# Patient Record
Sex: MALE | Race: WHITE | NOT HISPANIC OR LATINO | Employment: OTHER | URBAN - METROPOLITAN AREA
[De-identification: names, ages, dates, MRNs, and addresses within clinical notes are randomized per-mention and may not be internally consistent; named-entity substitution may affect disease eponyms.]

---

## 2017-01-19 RX ORDER — AMLODIPINE BESYLATE 5 MG/1
TABLET ORAL
Qty: 60 TABLET | Refills: 0 | Status: SHIPPED | OUTPATIENT
Start: 2017-01-19 | End: 2017-02-15 | Stop reason: SDUPTHER

## 2017-01-24 ENCOUNTER — TELEPHONE (OUTPATIENT)
Dept: FAMILY MEDICINE | Facility: CLINIC | Age: 82
End: 2017-01-24

## 2017-01-24 NOTE — TELEPHONE ENCOUNTER
----- Message from Vonnie Elias sent at 1/24/2017 10:02 AM CST -----  Contact: [phone]  Pt is requesting a call back in regards to medication that the insurance doesn't want to approve Please call pt at your earliest convenience.  Thanks !

## 2017-01-25 ENCOUNTER — TELEPHONE (OUTPATIENT)
Dept: FAMILY MEDICINE | Facility: CLINIC | Age: 82
End: 2017-01-25

## 2017-01-25 NOTE — TELEPHONE ENCOUNTER
----- Message from Cem Amaya sent at 1/25/2017 11:48 AM CST -----  Contact: self  Pt requesting a call back regarding the PA for chlordiazepoxide (LIBRIUM) 10 MG capsule.  Please call pt at .    Thanks

## 2017-01-25 NOTE — TELEPHONE ENCOUNTER
----- Message from Vonnie Elias sent at 1/24/2017  4:27 PM CST -----  Contact: [phone]  Pt is returning the phone call Please call pt at your earliest convenience.  Thanks !

## 2017-02-05 RX ORDER — TAMSULOSIN HYDROCHLORIDE 0.4 MG/1
CAPSULE ORAL
Qty: 30 CAPSULE | Refills: 11 | Status: SHIPPED | OUTPATIENT
Start: 2017-02-05 | End: 2018-01-28 | Stop reason: SDUPTHER

## 2017-02-07 RX ORDER — FINASTERIDE 5 MG/1
TABLET, FILM COATED ORAL
Qty: 90 TABLET | Refills: 3 | Status: SHIPPED | OUTPATIENT
Start: 2017-02-07 | End: 2018-02-12 | Stop reason: SDUPTHER

## 2017-02-15 RX ORDER — AMLODIPINE BESYLATE 5 MG/1
TABLET ORAL
Qty: 60 TABLET | Refills: 0 | Status: SHIPPED | OUTPATIENT
Start: 2017-02-15 | End: 2017-03-16 | Stop reason: SDUPTHER

## 2017-02-16 NOTE — TELEPHONE ENCOUNTER
----- Message from Ludmila Ynag sent at 2/16/2017  1:47 PM CST -----  Refill: hydrocodone-acetaminophen 5-325mg (NORCO) 5-325 mg per tablet    Patient can be reached at [phone]. Thank you!

## 2017-02-17 RX ORDER — HYDROCODONE BITARTRATE AND ACETAMINOPHEN 5; 325 MG/1; MG/1
1 TABLET ORAL EVERY 6 HOURS PRN
Qty: 120 TABLET | Refills: 0 | Status: SHIPPED | OUTPATIENT
Start: 2017-02-17 | End: 2017-05-18 | Stop reason: SDUPTHER

## 2017-02-22 ENCOUNTER — OFFICE VISIT (OUTPATIENT)
Dept: PODIATRY | Facility: CLINIC | Age: 82
End: 2017-02-22
Payer: MEDICARE

## 2017-02-22 VITALS — BODY MASS INDEX: 23.04 KG/M2 | WEIGHT: 130 LBS | HEIGHT: 63 IN

## 2017-02-22 DIAGNOSIS — L60.2 ONYCHAUXIS: Primary | ICD-10-CM

## 2017-02-22 PROCEDURE — 17999 UNLISTD PX SKN MUC MEMB SUBQ: CPT | Mod: CSM,,, | Performed by: PODIATRIST

## 2017-02-22 PROCEDURE — 99999 PR PBB SHADOW E&M-EST. PATIENT-LVL II: CPT | Mod: PBBFAC,,, | Performed by: PODIATRIST

## 2017-02-22 PROCEDURE — 99499 UNLISTED E&M SERVICE: CPT | Mod: CSM,,, | Performed by: PODIATRIST

## 2017-03-09 RX ORDER — CHLORDIAZEPOXIDE HYDROCHLORIDE 10 MG/1
CAPSULE, GELATIN COATED ORAL
Qty: 60 CAPSULE | Refills: 5 | Status: SHIPPED | OUTPATIENT
Start: 2017-03-09 | End: 2017-06-06 | Stop reason: SDUPTHER

## 2017-03-10 RX ORDER — CHLORDIAZEPOXIDE HYDROCHLORIDE 10 MG/1
CAPSULE, GELATIN COATED ORAL
Qty: 60 CAPSULE | OUTPATIENT
Start: 2017-03-10

## 2017-03-16 RX ORDER — AMLODIPINE BESYLATE 5 MG/1
TABLET ORAL
Qty: 60 TABLET | Refills: 0 | Status: SHIPPED | OUTPATIENT
Start: 2017-03-16 | End: 2017-04-13 | Stop reason: SDUPTHER

## 2017-04-13 RX ORDER — AMLODIPINE BESYLATE 5 MG/1
TABLET ORAL
Qty: 60 TABLET | Refills: 0 | Status: SHIPPED | OUTPATIENT
Start: 2017-04-13 | End: 2017-06-08 | Stop reason: SDUPTHER

## 2017-05-09 RX ORDER — MIRTAZAPINE 45 MG/1
TABLET, FILM COATED ORAL
Qty: 30 TABLET | Refills: 11 | Status: SHIPPED | OUTPATIENT
Start: 2017-05-09 | End: 2017-07-13 | Stop reason: SDUPTHER

## 2017-05-18 NOTE — TELEPHONE ENCOUNTER
----- Message from Chelsea Olson sent at 5/18/2017  9:21 AM CDT -----  Contact: self  Refill request for Hydrocodone .     [phone]    LL

## 2017-05-19 RX ORDER — HYDROCODONE BITARTRATE AND ACETAMINOPHEN 5; 325 MG/1; MG/1
1 TABLET ORAL EVERY 6 HOURS PRN
Qty: 120 TABLET | Refills: 0 | Status: SHIPPED | OUTPATIENT
Start: 2017-05-19 | End: 2017-06-06 | Stop reason: SDUPTHER

## 2017-05-19 NOTE — TELEPHONE ENCOUNTER
CALL we got your pain med request and dr leslie mcdonough your last appt was last year and it is best to have an appt every 3-4 mo to get refills. We will process this one then set an appt before this one runs out      Looks like it lasts you 3 months?    Confirm that time      Return refill

## 2017-05-24 ENCOUNTER — OFFICE VISIT (OUTPATIENT)
Dept: PODIATRY | Facility: CLINIC | Age: 82
End: 2017-05-24
Payer: MEDICARE

## 2017-05-24 VITALS — BODY MASS INDEX: 23.04 KG/M2 | WEIGHT: 130 LBS | HEIGHT: 63 IN

## 2017-05-24 DIAGNOSIS — L60.2 ONYCHAUXIS: Primary | ICD-10-CM

## 2017-05-24 PROCEDURE — 99999 PR PBB SHADOW E&M-EST. PATIENT-LVL III: CPT | Mod: PBBFAC,,, | Performed by: PODIATRIST

## 2017-05-24 PROCEDURE — 17999 UNLISTD PX SKN MUC MEMB SUBQ: CPT | Mod: CSM,S$GLB,, | Performed by: PODIATRIST

## 2017-05-24 PROCEDURE — 99499 UNLISTED E&M SERVICE: CPT | Mod: S$GLB,,, | Performed by: PODIATRIST

## 2017-06-06 ENCOUNTER — OFFICE VISIT (OUTPATIENT)
Dept: FAMILY MEDICINE | Facility: CLINIC | Age: 82
End: 2017-06-06
Payer: MEDICARE

## 2017-06-06 ENCOUNTER — LAB VISIT (OUTPATIENT)
Dept: LAB | Facility: HOSPITAL | Age: 82
End: 2017-06-06
Attending: INTERNAL MEDICINE
Payer: MEDICARE

## 2017-06-06 VITALS
DIASTOLIC BLOOD PRESSURE: 70 MMHG | SYSTOLIC BLOOD PRESSURE: 110 MMHG | BODY MASS INDEX: 23.28 KG/M2 | OXYGEN SATURATION: 97 % | HEIGHT: 63 IN | HEART RATE: 59 BPM | TEMPERATURE: 98 F | WEIGHT: 131.38 LBS

## 2017-06-06 VITALS
WEIGHT: 130.5 LBS | RESPIRATION RATE: 19 BRPM | TEMPERATURE: 98 F | DIASTOLIC BLOOD PRESSURE: 70 MMHG | HEIGHT: 63 IN | BODY MASS INDEX: 23.12 KG/M2 | OXYGEN SATURATION: 97 % | HEART RATE: 59 BPM | SYSTOLIC BLOOD PRESSURE: 110 MMHG

## 2017-06-06 DIAGNOSIS — F13.20 SEDATIVE DEPENDENCE: ICD-10-CM

## 2017-06-06 DIAGNOSIS — D64.9 ANEMIA, UNSPECIFIED TYPE: ICD-10-CM

## 2017-06-06 DIAGNOSIS — E87.5 HYPERKALEMIA: ICD-10-CM

## 2017-06-06 DIAGNOSIS — F39 MOOD DISORDER: ICD-10-CM

## 2017-06-06 DIAGNOSIS — I48.0 PAROXYSMAL ATRIAL FIBRILLATION: ICD-10-CM

## 2017-06-06 DIAGNOSIS — I10 ESSENTIAL HYPERTENSION: ICD-10-CM

## 2017-06-06 DIAGNOSIS — Z00.00 ENCOUNTER FOR PREVENTIVE HEALTH EXAMINATION: Primary | ICD-10-CM

## 2017-06-06 DIAGNOSIS — F41.9 CHRONIC ANXIETY: ICD-10-CM

## 2017-06-06 DIAGNOSIS — Z86.73 HISTORY OF STROKE: ICD-10-CM

## 2017-06-06 DIAGNOSIS — N40.0 BENIGN PROSTATIC HYPERPLASIA, PRESENCE OF LOWER URINARY TRACT SYMPTOMS UNSPECIFIED, UNSPECIFIED MORPHOLOGY: ICD-10-CM

## 2017-06-06 DIAGNOSIS — Z79.01 ANTICOAGULATED ON COUMADIN: ICD-10-CM

## 2017-06-06 DIAGNOSIS — I77.9 CAROTID DISEASE, BILATERAL: ICD-10-CM

## 2017-06-06 DIAGNOSIS — E78.5 HYPERLIPIDEMIA, UNSPECIFIED HYPERLIPIDEMIA TYPE: ICD-10-CM

## 2017-06-06 DIAGNOSIS — M06.9 RHEUMATOID ARTHRITIS, INVOLVING UNSPECIFIED SITE, UNSPECIFIED RHEUMATOID FACTOR PRESENCE: ICD-10-CM

## 2017-06-06 DIAGNOSIS — N17.9 ACUTE RENAL FAILURE, UNSPECIFIED ACUTE RENAL FAILURE TYPE: ICD-10-CM

## 2017-06-06 DIAGNOSIS — I70.1 RAS (RENAL ARTERY STENOSIS): ICD-10-CM

## 2017-06-06 DIAGNOSIS — Z00.00 ROUTINE MEDICAL EXAM: Primary | ICD-10-CM

## 2017-06-06 DIAGNOSIS — G89.29 OTHER CHRONIC PAIN: ICD-10-CM

## 2017-06-06 LAB
ALBUMIN SERPL BCP-MCNC: 4.1 G/DL
ALP SERPL-CCNC: 92 U/L
ALT SERPL W/O P-5'-P-CCNC: 20 U/L
ANION GAP SERPL CALC-SCNC: 10 MMOL/L
AST SERPL-CCNC: 23 U/L
BASOPHILS # BLD AUTO: 0.04 K/UL
BASOPHILS NFR BLD: 0.4 %
BILIRUB SERPL-MCNC: 0.6 MG/DL
BUN SERPL-MCNC: 20 MG/DL
CALCIUM SERPL-MCNC: 9.4 MG/DL
CHLORIDE SERPL-SCNC: 106 MMOL/L
CO2 SERPL-SCNC: 24 MMOL/L
CREAT SERPL-MCNC: 1.5 MG/DL
DIFFERENTIAL METHOD: ABNORMAL
EOSINOPHIL # BLD AUTO: 0.1 K/UL
EOSINOPHIL NFR BLD: 0.7 %
ERYTHROCYTE [DISTWIDTH] IN BLOOD BY AUTOMATED COUNT: 14.1 %
EST. GFR  (AFRICAN AMERICAN): 48 ML/MIN/1.73 M^2
EST. GFR  (NON AFRICAN AMERICAN): 41.6 ML/MIN/1.73 M^2
GLUCOSE SERPL-MCNC: 104 MG/DL
HCT VFR BLD AUTO: 43.7 %
HGB BLD-MCNC: 13.9 G/DL
LYMPHOCYTES # BLD AUTO: 2.1 K/UL
LYMPHOCYTES NFR BLD: 20.5 %
MCH RBC QN AUTO: 28.7 PG
MCHC RBC AUTO-ENTMCNC: 31.8 %
MCV RBC AUTO: 90 FL
MONOCYTES # BLD AUTO: 1 K/UL
MONOCYTES NFR BLD: 10 %
NEUTROPHILS # BLD AUTO: 6.9 K/UL
NEUTROPHILS NFR BLD: 68.2 %
PLATELET # BLD AUTO: 266 K/UL
PMV BLD AUTO: 10.7 FL
POTASSIUM SERPL-SCNC: 4.8 MMOL/L
PROT SERPL-MCNC: 8.1 G/DL
RBC # BLD AUTO: 4.85 M/UL
SODIUM SERPL-SCNC: 140 MMOL/L
TSH SERPL DL<=0.005 MIU/L-ACNC: 1.57 UIU/ML
WBC # BLD AUTO: 10.12 K/UL

## 2017-06-06 PROCEDURE — 80053 COMPREHEN METABOLIC PANEL: CPT

## 2017-06-06 PROCEDURE — 84165 PROTEIN E-PHORESIS SERUM: CPT

## 2017-06-06 PROCEDURE — 99397 PER PM REEVAL EST PAT 65+ YR: CPT | Mod: 25,S$GLB,, | Performed by: INTERNAL MEDICINE

## 2017-06-06 PROCEDURE — 85025 COMPLETE CBC W/AUTO DIFF WBC: CPT

## 2017-06-06 PROCEDURE — 86334 IMMUNOFIX E-PHORESIS SERUM: CPT

## 2017-06-06 PROCEDURE — 1126F AMNT PAIN NOTED NONE PRSNT: CPT | Mod: S$GLB,,, | Performed by: INTERNAL MEDICINE

## 2017-06-06 PROCEDURE — 86334 IMMUNOFIX E-PHORESIS SERUM: CPT | Mod: 26,,, | Performed by: PATHOLOGY

## 2017-06-06 PROCEDURE — 99499 UNLISTED E&M SERVICE: CPT | Mod: S$GLB,,, | Performed by: INTERNAL MEDICINE

## 2017-06-06 PROCEDURE — 1159F MED LIST DOCD IN RCRD: CPT | Mod: S$GLB,,, | Performed by: INTERNAL MEDICINE

## 2017-06-06 PROCEDURE — G0439 PPPS, SUBSEQ VISIT: HCPCS | Mod: S$GLB,,, | Performed by: NURSE PRACTITIONER

## 2017-06-06 PROCEDURE — 84443 ASSAY THYROID STIM HORMONE: CPT

## 2017-06-06 PROCEDURE — 36415 COLL VENOUS BLD VENIPUNCTURE: CPT | Mod: PO

## 2017-06-06 PROCEDURE — 85651 RBC SED RATE NONAUTOMATED: CPT

## 2017-06-06 PROCEDURE — 99499 UNLISTED E&M SERVICE: CPT | Mod: S$GLB,,, | Performed by: NURSE PRACTITIONER

## 2017-06-06 PROCEDURE — 99999 PR PBB SHADOW E&M-EST. PATIENT-LVL V: CPT | Mod: PBBFAC,,, | Performed by: NURSE PRACTITIONER

## 2017-06-06 PROCEDURE — 99214 OFFICE O/P EST MOD 30 MIN: CPT | Mod: 25,S$GLB,, | Performed by: INTERNAL MEDICINE

## 2017-06-06 PROCEDURE — 99999 PR PBB SHADOW E&M-EST. PATIENT-LVL III: CPT | Mod: PBBFAC,,, | Performed by: INTERNAL MEDICINE

## 2017-06-06 RX ORDER — CHLORDIAZEPOXIDE HYDROCHLORIDE 10 MG/1
20 CAPSULE, GELATIN COATED ORAL NIGHTLY
Qty: 60 CAPSULE | Refills: 5 | Status: SHIPPED | OUTPATIENT
Start: 2017-06-06 | End: 2017-10-02 | Stop reason: SDUPTHER

## 2017-06-06 RX ORDER — HYDROCODONE BITARTRATE AND ACETAMINOPHEN 5; 325 MG/1; MG/1
1 TABLET ORAL EVERY 6 HOURS PRN
Qty: 120 TABLET | Refills: 0 | Status: SHIPPED | OUTPATIENT
Start: 2017-06-06 | End: 2017-10-16 | Stop reason: SDUPTHER

## 2017-06-06 NOTE — PROGRESS NOTES
CHIEF COMPLAINT:  Physical.                                                                                                                     HISTORY OF PRESENT ILLNESS:  An 86year-old white male going to the gym      three times per week.  He simply just does not have an appetite.     He is a nonsmoker.  His chestx-ray in 2008 was normal.  Colonoscopy in 2006, normal with next in 10       Years and not likely clinically indicated based upon his age.  He has had PSAs in the past but also not likely indicated to continue to follow the previously normal PSA based upon his age  .                                                                                          REVIEW OF SYSTEMS:  Weight  stable currently   .  No fevers or chills.  No    vision or ENT symptoms.  No dysphagia.  No chest pain, shortness of breath.   No GI or  problems that are new.  No new myalgias or arthralgias.  No     new skin rashes.  All other complete review of systems negative.                                                                                          PAST MEDICAL HISTORY:                                                        1. Hypertension.                                                             2. Hyperlipidemia.                                                           3. Severe knee DJD.                                                          4. Questionable rheumatoid arthritis, the patient presented to our clinic on prednisone 5 mg, started by a rheumatologist Dr. Dao. The patient  denied ever having any hand, DJD or arthritis problems.                      5. Remote history of peptic ulcer disease requiring surgery.                 6. Lumbar disk surgery by Dr. Mckay.                                      7. Tinea corporis.                                                           8. Nonspecific stress echo at [facility] 2000.                                  9. Cardiac angiogram apparently at [facility]  with chest pain that was  unremarkable, no report.                                                     10. Chronic anxiety and occasional panic attacks.                            11. ED.                                                                      12. Gout.                                                                    13. Prostatitis/ BPH -Dr Ranjit Reyna                                                         14. Neck pain with numbness in both arms, assessed by Dr. Mackey  15.  Atrial fibrillation.    Sandra                                                 16.  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                                                     17.  Severe carotid disease, 75% right internal carotid, 50% left internal  carotid and 70% left external carotid.     18.   Colonoscopy in 2006, normal                                                                                                                                                                                                                                                        SOCIAL HISTORY: Does not smoke or drink.                                                                                                                  FAMILY HISTORY: Unremarkable.                                   PHYSICAL EXAMINATION:                                                        VITAL SIGNS:   as above    GENERAL:  Pleasant thin male.                                                HEENT:  Conjunctivae clear.  Pupils equal and reactive.  Nose and mouth      clear and moist.  Teeth edentulous.  Impaction on right removed with pick by MD himself                                        NECK:  Supple.  Thyroid nonpalpable.                                         RESPIRATORY:  Effort is good.                                                LUNGS:  All clear.                                                           HEART:  Regular rate and rhythm without murmurs, gallops or  rubs.  No        carotid bruits.  No edema.                                                   ABDOMEN:  Soft, nondistended, nontender.  No hepatosplenomegaly.             SKIN:  No rashes.  Warm to touch.                                            EXTREMITIES:  Gait normal.  Digits without clubbing or cyanosis.                                                                                          Labs and x-ray reports reviewed as was colonoscopy.                                                                                                       Cerumen impaction procedure note: after verbal informed consent obtained, physician himself remove the wax from the right ear with an ear pick    Quintana was seen today for cerumen impaction.    Diagnoses and all orders for this visit:    Routine medical exam, we'll update lab work.  Pursuing further colonoscopy and PSA are not appropriate upon his age.  He will continue to be active.  Prescription management done.                                          Additional evaluation and management issues:    Additionally, patient has numerous other total issues to address today.  We reviewed his prior labs and he has had a history of mild hyperkalemia and CKD which we will reassess.  He has rheumatoid arthritis but his clinical disease appears stable.  He has hyperlipidemia which has been assessed in the past and has been normal so we will defer and likely he has had it checked recently by his cardiologist.  He sees the heart clinic for his atrial fibrillation and his monitoring of his Coumadin.  We'll recheck a CBC on chronic anticoagulation.  He does have a history of stroke and blood pressures under good control and he has had no interval neurological deficits.  He has had a slight hemoglobin reduction which we will reassess.  His essential hypertension has been under good control.  His weight is been stable but we will check a TSH.  Regarding his chronic pains is 1  prescription of hydrocodone 120 has lasted in 3 months and he would like a refill.  He wastaking Valium during the day but caused sedation so quit and doing good on celexa.  Librium at night for sleep and he has done so chronically without side effects and he was on this particular pattern of medications prior to being under my care.  CKD III needs repeat. All the separate issues reviewed and patient counseled an evaluation and management will be based upon time counseling.Total time over 25 minutes with over 50% counseling.          Assessment and plan:            Hyperkalemia, reassess    Rheumatoid arthritis, involving unspecified site, unspecified rheumatoid factor presence, contributing to his overall chronic pain for which prescription management and monitoring done today.  He only takes 1 pain pill a day in the prescription of 120 typically last him 3 months    Essential hypertension, chronic and stable    Anticoagulated on Coumadin, check CBC    Hyperlipidemia, unspecified hyperlipidemia type, chronic and stable, followed by outside cardiologist as well    History of stroke, no recent neurological symptoms    Anemia, unspecified type, reassess    TO (renal artery stenosis), blood pressure and renal function will be assessed    Chronic anxiety, discontinue the Valium during the day which is perfectly okay and continues on the Librium at night    Mood disorder, continue Celexa    Paroxysmal atrial fibrillation, rate controlled    Benign prostatic hyperplasia, presence of lower urinary tract symptoms unspecified, unspecified morphology    Other chronic pain, management as above    Sedative dependence, 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

## 2017-06-07 PROBLEM — I10 ESSENTIAL HYPERTENSION: Status: ACTIVE | Noted: 2017-06-07

## 2017-06-07 PROBLEM — F39 MOOD DISORDER: Status: ACTIVE | Noted: 2017-06-07

## 2017-06-07 LAB
ALBUMIN SERPL ELPH-MCNC: 4.13 G/DL
ALPHA1 GLOB SERPL ELPH-MCNC: 0.31 G/DL
ALPHA2 GLOB SERPL ELPH-MCNC: 0.82 G/DL
B-GLOBULIN SERPL ELPH-MCNC: 1.17 G/DL
ERYTHROCYTE [SEDIMENTATION RATE] IN BLOOD BY WESTERGREN METHOD: 3 MM/HR
GAMMA GLOB SERPL ELPH-MCNC: 1.27 G/DL
PROT SERPL-MCNC: 7.7 G/DL

## 2017-06-07 NOTE — PROGRESS NOTES
"Mariano Clay presented for a  Medicare AWV and comprehensive Health Risk Assessment today. The following components were reviewed and updated:    · Medical history  · Family History  · Social history  · Allergies and Current Medications  · Health Risk Assessment  · Health Maintenance  · Care Team     ** See Completed Assessments for Annual Wellness Visit within the encounter summary.**       The following assessments were completed:  · Living Situation  · CAGE  · Depression Screening  · Timed Get Up and Go  · Whisper Test  · Cognitive Function Screening  · Nutrition Screening  · ADL Screening  · PAQ Screening    Vitals:    06/06/17 1343   BP: 110/70   BP Location: Left arm   Patient Position: Sitting   BP Method: Manual   Pulse: (!) 59   Resp: 19   Temp: 97.7 °F (36.5 °C)   TempSrc: Oral   SpO2: 97%   Weight: 59.2 kg (130 lb 8.2 oz)   Height: 5' 3" (1.6 m)     Body mass index is 23.12 kg/m².  Physical Exam   Constitutional: He is oriented to person, place, and time.   Cardiovascular: Normal rate and regular rhythm.    Pulmonary/Chest: Effort normal.   Musculoskeletal: Normal range of motion. He exhibits no edema.   Neurological: He is alert and oriented to person, place, and time.   Skin: Skin is warm.   Psychiatric: He has a normal mood and affect. His behavior is normal. Thought content normal.   Vitals reviewed.        Diagnoses and health risks identified today and associated recommendations/orders:    1. Encounter for preventive health examination  Education provided about preventive health examinations and procedures; addressed and discussed patient's health concerns. Additionally, reviewed medical record for risk factors and documented the results during this encounter.    2. Paroxysmal atrial fibrillation  Stable, followed by [facility], currently denies worsening symptoms; continue as advised.     3. Carotid disease, bilateral  Stable, asymptomatic on ASA, cholesterol managing STATIN, and " antihypertensive medication; monitor    4. Rheumatoid arthritis, involving unspecified site, unspecified rheumatoid factor presence  Evaluated by rheumatology (Dr. Dao). Currently denies any problems.     5. Mood disorder  Symptoms controlled. Education provided about mood disorder. Continue as advised.     6. Chronic anxiety  Symptoms controlled. Education provided about anxiety. Continue as advised.     7. Anticoagulated on Coumadin  Stable, followed by [facility], currently denies worsening symptoms; continue as advised.     8. History of stroke  Stable, asymptomatic on ASA, cholesterol managing STATIN, and antihypertensive medication; monitor    9. Hyperlipidemia, unspecified hyperlipidemia type  Educated about diet, activities, and ways to avoid sedentary lifestyle.   Cholesterol monitored by cardiologist.     10. Benign prostatic hyperplasia, presence of lower urinary tract symptoms unspecified, unspecified morphology  Symptoms controlled. Education provided about BPH. Continue as advised.     11. Anemia, unspecified type  Stable, asymptomatic; monitor.     12. Essential hypertension  At goal. The current medical regimen is effective;  continue present plan and medications.    13. Hyperkalemia  Patient will discuss with PCP. Scheduled to get labs completed today after PCP visit.     Reviewed health maintenance with patient, educated about recommended examinations, procedures (labs & images), and immunizations.  He deferred pneumonia vaccine until he's seen by PCP.      Provided Quintana with a 5-10 year written screening schedule and personal prevention plan. Recommendations were developed using the USPSTF age appropriate recommendations. Education, counseling, and referrals were provided as needed. After Visit Summary printed and given to patient which includes a list of additional screenings\tests needed.    Return in about 1 year (around 6/6/2018).    Mahin Renae Jr, NP

## 2017-06-07 NOTE — PATIENT INSTRUCTIONS
Counseling and Referral of Other Preventative  (Italic type indicates deductible and co-insurance are waived)    Patient Name: Mariano Clay  Today's Date: 6/7/2017      SERVICE LIMITATIONS RECOMMENDATION    Vaccines    · Pneumococcal (once after 65)    · Influenza (annually)    · Hepatitis B (if medium/high risk)    · Prevnar 13      Hepatitis B medium/high risk factors:       - End-stage renal disease       - Hemophiliacs who received Factor VII or         IX concentrates       - Clients of institutions for the mentally             retarded       - Persons who live in the same house as          a HepB carrier       - Homosexual men       - Illicit injectable drug abusers     Pneumococcal: Done, no repeat necessary     Influenza: Done, repeat in one year     Hepatitis B: N/A     Prevnar 13: Done, no repeat necessary    Prostate cancer screening (annually to age 75)     Prostate specific antigen (PSA) Shared decision making with Provider. Sometimes a co-pay may be required if the patient decides to have this test. The USPSTF no longer recommends prostate cancer screening routinely in medicine: followed by [facility] urology     Colorectal cancer screening (to age 75)    · Fecal occult blood test (annual)  · Flexible sigmoidoscopy (5y)  · Screening colonoscopy (10y)  · Barium enema   N/A    Diabetes self-management training (no USPSTF recommendations)  Requires referral by treating physician for patient with diabetes or renal disease. 10 hours of initial DSMT sessions of no less than 30 minutes each in a continuous 12-month period. 2 hours of follow-up DSMT in subsequent years.  N/A    Glaucoma screening (no USPSTF recommendation)  Diabetes mellitus, family history   , age 50 or over    American, age 65 or over  discussed with patient      Medical nutrition therapy for diabetes or renal disease (no recommended schedule)  Requires referral by treating physician for patient with diabetes or renal  disease or kidney transplant within the past 3 years.  Can be provided in same year as diabetes self-management training (DSMT), and CMS recommends medical nutrition therapy take place after DSMT. Up to 3 hours for initial year and 2 hours in subsequent years.  N/A    Cardiovascular screening blood tests (every 5 years)  · Fasting lipid panel  Order as a panel if possible  Last done 12/2013, he's [facility]     Diabetes screening tests (at least every 3 years, Medicare covers annually or at 6-month intervals for prediabetic patients)  · Fasting blood sugar (FBS) or glucose tolerance test (GTT)  Patient must be diagnosed with one of the following:       - Hypertension       - Dyslipidemia       - Obesity (BMI 30kg/m2)       - Previous elevated impaired FBS or GTT       ... or any two of the following:       - Overweight (BMI 25 but <30)       - Family history of diabetes       - Age 65 or older       - History of gestational diabetes or birth of baby weighing more than 9 pounds  FBS completed December 2013, labs are being drawn today for PCP visit     Abdominal aortic aneurysm screening (once)  · Sonogram   Limited to patients who meet one of the following criteria:       - Men who are 65-75 years old and have smoked more than 100 cigarette in their lifetime       - Anyone with a family history of abdominal aortic aneurysm       - Anyone recommended for screening by the USPSTF  no clinical risk factors  Non smoker     HIV screening (annually for increased risk patients)  · HIV-1 and HIV-2 by EIA, or HUMA, rapid antibody test or oral mucosa transudate  Patients must be at increased risk for HIV infection per USPSTF guidelines or pregnant. Tests covered annually for patient at increased risk or as requested by the patient. Pregnant patients may receive up to 3 tests during pregnancy. no clinical risk factors      Smoking cessation counseling (up to 8 sessions per year)  Patients must be  asymptomatic of tobacco-related conditions to receive as a preventative service.  Non-smoker    Subsequent annual wellness visit  At least 12 months since last AWV  Return in one year     The following information is provided to all patients.  This information is to help you find resources for any of the problems found today that may be affecting your health:                Living healthy guide: www.[facility].[place].[facility]      Understanding Diabetes: www.diabetes.org      Eating healthy: www.cdc.gov/healthyweight      CDC home safety checklist: www.cdc.gov/steadi/patient.html      Agency on Aging: www.goea.[place].[facility]      Alcoholics anonymous (AA): www.aa.org      Physical Activity: www.maura.nih.gov/vy3eppn      Tobacco use: www.quitwithusla.org

## 2017-06-08 LAB
INTERPRETATION SERPL IFE-IMP: NORMAL
PATHOLOGIST INTERPRETATION IFE: NORMAL

## 2017-06-08 RX ORDER — AMLODIPINE BESYLATE 5 MG/1
TABLET ORAL
Qty: 60 TABLET | Refills: 6 | Status: SHIPPED | OUTPATIENT
Start: 2017-06-08 | End: 2017-12-31 | Stop reason: SDUPTHER

## 2017-07-05 RX ORDER — CITALOPRAM 10 MG/1
TABLET ORAL
Qty: 30 TABLET | Refills: 11 | Status: SHIPPED | OUTPATIENT
Start: 2017-07-05 | End: 2017-11-20 | Stop reason: SDUPTHER

## 2017-07-13 RX ORDER — MIRTAZAPINE 45 MG/1
45 TABLET, FILM COATED ORAL NIGHTLY
Qty: 30 TABLET | Refills: 11 | Status: SHIPPED | OUTPATIENT
Start: 2017-07-13 | End: 2018-08-22 | Stop reason: SDUPTHER

## 2017-08-23 ENCOUNTER — OFFICE VISIT (OUTPATIENT)
Dept: PODIATRY | Facility: CLINIC | Age: 82
End: 2017-08-23
Payer: MEDICARE

## 2017-08-23 DIAGNOSIS — L60.2 ONYCHAUXIS: Primary | ICD-10-CM

## 2017-08-23 PROCEDURE — 17999 UNLISTD PX SKN MUC MEMB SUBQ: CPT | Mod: CSM,,, | Performed by: PODIATRIST

## 2017-08-23 PROCEDURE — 99999 PR PBB SHADOW E&M-EST. PATIENT-LVL II: CPT | Mod: PBBFAC,,, | Performed by: PODIATRIST

## 2017-08-23 PROCEDURE — 99499 UNLISTED E&M SERVICE: CPT | Mod: CSM,,, | Performed by: PODIATRIST

## 2017-10-02 RX ORDER — CHLORDIAZEPOXIDE HYDROCHLORIDE 10 MG/1
CAPSULE, GELATIN COATED ORAL
Qty: 60 CAPSULE | Refills: 5 | Status: SHIPPED | OUTPATIENT
Start: 2017-10-02 | End: 2018-04-28 | Stop reason: SDUPTHER

## 2017-10-03 RX ORDER — CHLORDIAZEPOXIDE HYDROCHLORIDE 10 MG/1
CAPSULE, GELATIN COATED ORAL
Qty: 60 CAPSULE | OUTPATIENT
Start: 2017-10-03

## 2017-10-03 NOTE — TELEPHONE ENCOUNTER
----- Message from Park Riley sent at 10/3/2017  2:50 PM CDT -----  Contact: Self  Pt called to f/u on request for chlordiazepoxide (LIBRIUM) 10 MG capsule. Pt states pharmacy has not received Rx. Pt can be reached @ [phone].

## 2017-10-03 NOTE — TELEPHONE ENCOUNTER
----- Message from Park Riley sent at 10/3/2017  2:50 PM CDT -----  Contact: Self  Pt called to f/u on request for chlordiazepoxide (LIBRIUM) 10 MG capsule. Pt states pharmacy has not received Rx. Pt can be reached @ [phone].

## 2017-10-16 RX ORDER — HYDROCODONE BITARTRATE AND ACETAMINOPHEN 5; 325 MG/1; MG/1
1 TABLET ORAL EVERY 6 HOURS PRN
Qty: 120 TABLET | Refills: 0 | Status: SHIPPED | OUTPATIENT
Start: 2017-10-16 | End: 2018-02-08 | Stop reason: SDUPTHER

## 2017-11-18 ENCOUNTER — TELEPHONE (OUTPATIENT)
Dept: FAMILY MEDICINE | Facility: CLINIC | Age: 82
End: 2017-11-18

## 2017-11-18 NOTE — TELEPHONE ENCOUNTER
Spoke with patient. He states that he is currently on Celexa 10 mg every am for depression. He states that around 12 noon, the medication wears off and he begins to have feelings of depression again. He would like to know if something else can be called in that he can take twice daily.      Patient is also taking Librium at bedtime to help him sleep.     Please advise re: depression medication

## 2017-11-18 NOTE — TELEPHONE ENCOUNTER
----- Message from Alison Parson sent at 11/17/2017  1:35 PM CST -----  Patient states that the depression medication is not working and requests to be put on something else. He can be reached at [phone]. Thank you!

## 2017-11-20 RX ORDER — CITALOPRAM 20 MG/1
20 TABLET, FILM COATED ORAL DAILY
Qty: 90 TABLET | Refills: 90 | Status: SHIPPED | OUTPATIENT
Start: 2017-11-20 | End: 2018-12-05 | Stop reason: SDUPTHER

## 2017-11-20 NOTE — TELEPHONE ENCOUNTER
Advise we will increase the Celexa to 20 mg a day    Okay to take 2 of the 10 mg pills at one time in the morning until he uses them up and I did send the 20 mg pill to [facility]

## 2017-11-21 ENCOUNTER — TELEPHONE (OUTPATIENT)
Dept: FAMILY MEDICINE | Facility: CLINIC | Age: 82
End: 2017-11-21

## 2017-11-21 NOTE — TELEPHONE ENCOUNTER
----- Message from Park Riley sent at 11/20/2017  3:52 PM CST -----  Contact: Self  Pt called to f/u on message sent on Friday, 11/17. Pt can be reached @ [phone].

## 2017-11-29 ENCOUNTER — OFFICE VISIT (OUTPATIENT)
Dept: PODIATRY | Facility: CLINIC | Age: 82
End: 2017-11-29
Payer: MEDICARE

## 2017-11-29 ENCOUNTER — CLINICAL SUPPORT (OUTPATIENT)
Dept: FAMILY MEDICINE | Facility: CLINIC | Age: 82
End: 2017-11-29
Payer: MEDICARE

## 2017-11-29 VITALS — BODY MASS INDEX: 23.21 KG/M2 | WEIGHT: 131 LBS | HEIGHT: 63 IN

## 2017-11-29 DIAGNOSIS — L60.2 ONYCHAUXIS: Primary | ICD-10-CM

## 2017-11-29 DIAGNOSIS — Z23 NEED FOR PROPHYLACTIC VACCINATION AND INOCULATION AGAINST INFLUENZA: Primary | ICD-10-CM

## 2017-11-29 PROCEDURE — 99999 PR PBB SHADOW E&M-EST. PATIENT-LVL I: CPT | Mod: PBBFAC,,,

## 2017-11-29 PROCEDURE — 99499 UNLISTED E&M SERVICE: CPT | Mod: S$GLB,,, | Performed by: PODIATRIST

## 2017-11-29 PROCEDURE — 99999 PR PBB SHADOW E&M-EST. PATIENT-LVL IV: CPT | Mod: PBBFAC,,, | Performed by: PODIATRIST

## 2017-11-29 PROCEDURE — G0008 ADMIN INFLUENZA VIRUS VAC: HCPCS | Mod: S$GLB,,, | Performed by: INTERNAL MEDICINE

## 2017-11-29 PROCEDURE — 17999 UNLISTD PX SKN MUC MEMB SUBQ: CPT | Mod: CSM,S$GLB,, | Performed by: PODIATRIST

## 2017-11-29 PROCEDURE — 99499 UNLISTED E&M SERVICE: CPT | Mod: S$GLB,,, | Performed by: INTERNAL MEDICINE

## 2017-11-29 PROCEDURE — 90662 IIV NO PRSV INCREASED AG IM: CPT | Mod: S$GLB,,, | Performed by: INTERNAL MEDICINE

## 2017-11-29 NOTE — PROGRESS NOTES
Flu injection given &.VIS given. Tolerated injection well.Patient instructed to wait 15 minutes for monitoring. .

## 2017-11-29 NOTE — PATIENT INSTRUCTIONS
Recommend lotions: eucerin, aquaphor, A&D ointment, gold bond for diabetics, sween; urea 40 with aloe (found on amazon.com)    Shoe recommendations: (try 6pm.com, zappos.com , nordstromrack.BioArray, or shoes.com for discounted prices) you can visit DSW shoes in [place] as well    Asics (GT 2000 or gel foundations), new balance, saucony (stabil c3),  Light (GTS or Beast or transcend), vionic, propet (tennis shoe)    sofyaw brand, clarks, crocs, aerosoles, naturalizers, SAS, ecco, david, luana mathur, rockports (dress shoes)    Vionic, burkenstocks, fitflops, propet (sandals)    Nike comfort thong sandals, crocs, propet (house shoes)    Nail Home remedy:  Vicks Vapor rub to nails for easier managability    Occasional soaks for 15-20 mins in luke warm water with 1 cup of listerine and 1 cup of apple cider vinegar are ok You may add several drops of oil of oregano or tea tree oil as well

## 2017-12-04 ENCOUNTER — TELEPHONE (OUTPATIENT)
Dept: FAMILY MEDICINE | Facility: CLINIC | Age: 82
End: 2017-12-04

## 2017-12-04 ENCOUNTER — NURSE TRIAGE (OUTPATIENT)
Dept: ADMINISTRATIVE | Facility: CLINIC | Age: 82
End: 2017-12-04

## 2017-12-04 NOTE — TELEPHONE ENCOUNTER
----- Message from Park Riley sent at 12/2/2017  8:26 AM CST -----  Contact: Self  Pt states he ad a flu shot on Wednesday, 11/29 and his arm is now red at the injection site up until his elbow. Pt can be reached @[phone].

## 2017-12-04 NOTE — TELEPHONE ENCOUNTER
Patient states he has bruising around injection site from 11/29/17 when he received the flu shot. Patient is on blood thinners. Please advise per message below.

## 2017-12-04 NOTE — TELEPHONE ENCOUNTER
If bruising not too bad, just apply warm compresses and do not stop blood thinners and so forth.  If he is on Coumadin, have him contact the Coumadin clinic

## 2017-12-04 NOTE — TELEPHONE ENCOUNTER
Spoke to Mr. Clay and notified of Dr. BASILIO's recommendations. Verbalized understanding. States that he is doing the compresses but is really worried about the bruising. Reports that the bruising is from his shoulder to his elbow and is black. I informed patient that he can be seen in urgent care to have it checked or go to the ER if he deems necessary. Patient states that he will call tomorrow to schedule an appointment with a provider after he speaks to his son.

## 2017-12-04 NOTE — TELEPHONE ENCOUNTER
"    Reason for Disposition   Taking Coumadin (warfarin) or other strong blood thinner, or known bleeding disorder (e.g., thrombocytopenia)    Answer Assessment - Initial Assessment Questions  1. APPEARANCE of BRUISE: "Describe the bruise."      Blue  2. SIZE: "How large is the bruise?"       4 inches  3. NUMBER: "How many bruises are there?"       -  4. LOCATION: "Where is the bruise located?"       Upper arm  5. ONSET: "How long ago did the bruise occur?"       Vaccination on Wednesday 11/29, patient noticed bruising a few days later  6. CAUSE: "Tell me how it happened."      injection  7. MEDICAL HISTORY: "Do you have any medical problems that can cause easy bruising or bleeding?" (e.g., leukemia, liver disease, recent chemotherapy)      Yes  8. MEDICATIONS : "Do you take any medications which thin the blood such as: aspirin, heparin, ibuprofen (NSAIDS), Plavix, or Coumadin?"     coumadin  9. OTHER SYMPTOMS: "Do you have any other symptoms?"  (e.g., weakness, dizziness, pain, fever, nosebleed, blood in urine/stool)      no  10. PREGNANCY: "Is there any chance you are pregnant?" "When was your last menstrual period?"        n/a    Protocols used: ST BRUISES-CLAUS      "

## 2018-01-01 RX ORDER — AMLODIPINE BESYLATE 5 MG/1
TABLET ORAL
Qty: 60 TABLET | Refills: 6 | Status: SHIPPED | OUTPATIENT
Start: 2018-01-01

## 2018-01-28 RX ORDER — TAMSULOSIN HYDROCHLORIDE 0.4 MG/1
CAPSULE ORAL
Qty: 30 CAPSULE | Refills: 11 | Status: SHIPPED | OUTPATIENT
Start: 2018-01-28 | End: 2019-01-26 | Stop reason: SDUPTHER

## 2018-02-08 RX ORDER — HYDROCODONE BITARTRATE AND ACETAMINOPHEN 5; 325 MG/1; MG/1
1 TABLET ORAL EVERY 6 HOURS PRN
Qty: 120 TABLET | Refills: 0 | Status: SHIPPED | OUTPATIENT
Start: 2018-02-08 | End: 2018-04-30 | Stop reason: SDUPTHER

## 2018-02-08 NOTE — TELEPHONE ENCOUNTER
----- Message from Glenn Minor sent at 2/8/2018  3:57 PM CST -----  Contact: self  Refill: hydrocodone-acetaminophen 5-325mg (NORCO) 5-325 mg per tablet. Contact pt at [phone].    Thanks-

## 2018-02-12 ENCOUNTER — TELEPHONE (OUTPATIENT)
Dept: FAMILY MEDICINE | Facility: CLINIC | Age: 83
End: 2018-02-12

## 2018-02-12 NOTE — TELEPHONE ENCOUNTER
----- Message from Alison Parson sent at 2/12/2018  4:09 PM CST -----  Contact: self  Refill: finasteride (PROSCAR) 5 mg tablet    [id number] \[facility]", [place] - [address]    Patient can be reached at [phone]. Thank you!

## 2018-02-12 NOTE — TELEPHONE ENCOUNTER
----- Message from Lo Osborne sent at 2/12/2018  2:40 PM CST -----  Contact: self  Pt would like a call back to verify if script is ready for . Please call back at  [phone].

## 2018-02-14 ENCOUNTER — OFFICE VISIT (OUTPATIENT)
Dept: PODIATRY | Facility: CLINIC | Age: 83
End: 2018-02-14
Payer: MEDICARE

## 2018-02-14 VITALS — HEIGHT: 63 IN | BODY MASS INDEX: 23.21 KG/M2 | WEIGHT: 131 LBS

## 2018-02-14 DIAGNOSIS — L60.2 ONYCHAUXIS: Primary | ICD-10-CM

## 2018-02-14 PROCEDURE — 17999 UNLISTD PX SKN MUC MEMB SUBQ: CPT | Mod: CSM,,, | Performed by: PODIATRIST

## 2018-02-14 PROCEDURE — 99499 UNLISTED E&M SERVICE: CPT | Mod: S$GLB,,, | Performed by: PODIATRIST

## 2018-02-14 PROCEDURE — 99999 PR PBB SHADOW E&M-EST. PATIENT-LVL III: CPT | Mod: PBBFAC,,, | Performed by: PODIATRIST

## 2018-02-14 RX ORDER — FINASTERIDE 5 MG/1
5 TABLET, FILM COATED ORAL DAILY
Qty: 90 TABLET | Refills: 3 | Status: SHIPPED | OUTPATIENT
Start: 2018-02-14 | End: 2019-02-07 | Stop reason: SDUPTHER

## 2018-02-14 RX ORDER — CITALOPRAM 10 MG/1
TABLET ORAL
COMMUNITY
Start: 2017-11-30 | End: 2020-02-07

## 2018-02-14 NOTE — PATIENT INSTRUCTIONS
Recommend lotions: eucerin, eucerin for diabetics, aquaphor, A&D ointment, gold bond for diabetics, sween, Captain Cook's Bees all purpose baby ointment,  urea 40 with aloe (found on amazon.com)    Shoe recommendations: (try 6pm.com, zappos.[employer] , nordstromrack.[employer], or shoes.[employer] for discounted prices) you can visit DSW shoes in [place]  or CX in the [facility] (there are also several shoe brand outlets in the [facility])    Asics (GT 2000 or gel foundations), new balance stability type shoes, saucony (stabil c3),  Light (GTS or Beast or transcend), vionic, propet (tennis shoe)    sofft brand, clarks, crocs, aerosoles, naturalizers, SAS, ecco, born, luana mathur, rockports (dress shoes)    Vionic, burkenstocks, fitflops, propet (sandals)  Nike comfort thong sandals, crocs, propet (house shoes)    Nail Home remedy:  Vicks Vapor rub to nails for easier managability    Occasional soaks for 15-20 mins in luke warm water with 1 cup of listerine and 1 cup of apple cider vinegar are ok You may add several drops of oil of oregano or tea tree oil as well

## 2018-03-22 ENCOUNTER — PES CALL (OUTPATIENT)
Dept: ADMINISTRATIVE | Facility: CLINIC | Age: 83
End: 2018-03-22

## 2018-04-23 ENCOUNTER — PES CALL (OUTPATIENT)
Dept: ADMINISTRATIVE | Facility: CLINIC | Age: 83
End: 2018-04-23

## 2018-04-30 ENCOUNTER — TELEPHONE (OUTPATIENT)
Dept: PODIATRY | Facility: CLINIC | Age: 83
End: 2018-04-30

## 2018-04-30 RX ORDER — HYDROCODONE BITARTRATE AND ACETAMINOPHEN 5; 325 MG/1; MG/1
1 TABLET ORAL EVERY 6 HOURS PRN
Qty: 120 TABLET | Refills: 0 | Status: SHIPPED | OUTPATIENT
Start: 2018-04-30 | End: 2018-10-16 | Stop reason: SDUPTHER

## 2018-04-30 RX ORDER — CHLORDIAZEPOXIDE HYDROCHLORIDE 10 MG/1
CAPSULE, GELATIN COATED ORAL
Qty: 60 CAPSULE | Refills: 3 | Status: SHIPPED | OUTPATIENT
Start: 2018-04-30 | End: 2018-09-28 | Stop reason: SDUPTHER

## 2018-04-30 NOTE — TELEPHONE ENCOUNTER
----- Message from Karla Yang sent at 4/30/2018  9:45 AM CDT -----  Contact: Self   Patient would like to speak with the nurse before his appointment on [phone].

## 2018-04-30 NOTE — TELEPHONE ENCOUNTER
----- Message from Karla Yang sent at 4/30/2018  9:24 AM CDT -----  Contact: Self   Patient would like to know if he can get a refill on his pain medication. Please call patient at [phone]      hydrocodone-acetaminophen 5-325mg (NORCO) 5-325 mg per tablet        [facility]/pharmacy #[id number] [facility], [place] - [address]

## 2018-05-01 RX ORDER — CHLORDIAZEPOXIDE HYDROCHLORIDE 10 MG/1
CAPSULE, GELATIN COATED ORAL
Qty: 60 CAPSULE | OUTPATIENT
Start: 2018-05-01

## 2018-05-02 ENCOUNTER — OFFICE VISIT (OUTPATIENT)
Dept: PODIATRY | Facility: CLINIC | Age: 83
End: 2018-05-02
Payer: MEDICARE

## 2018-05-02 VITALS — HEIGHT: 63 IN | BODY MASS INDEX: 23.21 KG/M2 | WEIGHT: 131 LBS

## 2018-05-02 DIAGNOSIS — L60.2 ONYCHAUXIS: Primary | ICD-10-CM

## 2018-05-02 PROCEDURE — 17999 UNLISTD PX SKN MUC MEMB SUBQ: CPT | Mod: CSM,S$GLB,, | Performed by: PODIATRIST

## 2018-05-02 PROCEDURE — 99499 UNLISTED E&M SERVICE: CPT | Mod: S$GLB,,, | Performed by: PODIATRIST

## 2018-05-02 PROCEDURE — 99999 PR PBB SHADOW E&M-EST. PATIENT-LVL IV: CPT | Mod: PBBFAC,,, | Performed by: PODIATRIST

## 2018-05-08 ENCOUNTER — TELEPHONE (OUTPATIENT)
Dept: UROLOGY | Facility: CLINIC | Age: 83
End: 2018-05-08

## 2018-05-08 NOTE — TELEPHONE ENCOUNTER
----- Message from Jimi Lopez sent at 5/8/2018  9:17 AM CDT -----  Contact: Mariano [phone]  Pt is requesting a call back from the staff in regards to an appointment. He wants to know if Dr. Blanco still sees patients at [facility]. Please call at your earliest convenience.

## 2018-05-15 ENCOUNTER — OFFICE VISIT (OUTPATIENT)
Dept: UROLOGY | Facility: CLINIC | Age: 83
End: 2018-05-15
Payer: MEDICARE

## 2018-05-15 VITALS
DIASTOLIC BLOOD PRESSURE: 64 MMHG | BODY MASS INDEX: 21.25 KG/M2 | WEIGHT: 119.94 LBS | HEART RATE: 55 BPM | SYSTOLIC BLOOD PRESSURE: 137 MMHG | HEIGHT: 63 IN

## 2018-05-15 DIAGNOSIS — N50.89 SCROTAL MASS: Primary | ICD-10-CM

## 2018-05-15 PROCEDURE — 99999 PR PBB SHADOW E&M-EST. PATIENT-LVL III: CPT | Mod: PBBFAC,,, | Performed by: UROLOGY

## 2018-05-15 PROCEDURE — 99203 OFFICE O/P NEW LOW 30 MIN: CPT | Mod: S$GLB,,, | Performed by: UROLOGY

## 2018-05-15 NOTE — PROGRESS NOTES
Subjective:       Patient ID: Mariano Clay Jr. is a 87 y.o. male.    Chief Complaint: Follow-up (eval for hydrocele, no c/o any urination problems)    HPI patient was seen at [facility] for left epididymitis and right hydrocele.  He's had hydrocele for many years.  It's large and cumbersome and he may be interested in having it repaired surgically in the future.  He's taking cephalexin at this time and is feeling somewhat better but he still has some occasional discomfort.  The hydrocele has not been getting larger.  He is voiding well without complaints urine is been clear   Past Medical History:   Diagnosis Date    Anemia 12/3/2012    Anticoagulated on Coumadin 12/9/2013    Atrial fibrillation 12/3/2012    BPH (benign prostatic hypertrophy) 12/9/2013    Carotid disease, bilateral 2/12/2015    Chronic anxiety 12/9/2013    Essential hypertension 6/7/2017    History of stroke 2/12/2015    Hydrocele of testis 12/3/2012    Hyperkalemia 12/9/2013    Hyperlipidemia 12/3/2012    Rheumatoid arthritis(714.0) 12/9/2013    Stroke        Past Surgical History:   Procedure Laterality Date    APPENDECTOMY      EYE SURGERY      cataracts removed     JOINT REPLACEMENT      bilateral knee replacement        History reviewed. No pertinent family history.    Social History     Social History    Marital status:      Spouse name: N/A    Number of children: N/A    Years of education: N/A     Occupational History    Not on file.     Social History Main Topics    Smoking status: Never Smoker    Smokeless tobacco: Never Used    Alcohol use 1.8 oz/week     3 Cans of beer per week    Drug use: No    Sexual activity: Not Currently     Other Topics Concern    Not on file     Social History Narrative    No narrative on file       Allergies:  No known drug allergies    Medications:    Current Outpatient Prescriptions:     amLODIPine (NORVASC) 5 MG tablet, TAKE 1 TABLET BY MOUTH TWICE A DAY, Disp: 60  tablet, Rfl: 6    aspirin (ECOTRIN) 81 MG EC tablet, Take 81 mg by mouth once daily.  , Disp: , Rfl:     atorvastatin (LIPITOR) 40 MG tablet, Take 40 mg by mouth once daily.  , Disp: , Rfl:     calcium carbonate (OS-JAKE) 600 mg (1,500 mg) Tab, Take 600 mg by mouth 2 (two) times daily with meals.  , Disp: , Rfl:     chlordiazepoxide (LIBRIUM) 10 MG capsule, TAKE 2 CAPSULES BY MOUTH AT BEDTIME, Disp: 60 capsule, Rfl: 3    ciclopirox (PENLAC) 8 % Soln, Apply topically once daily. Apply topically to affected nails once daily.  Remove once weekly.  Repeat.  Follow package insert., Disp: 6.6 mL, Rfl: 12    citalopram (CELEXA) 10 MG tablet, , Disp: , Rfl:     citalopram (CELEXA) 20 MG tablet, Take 1 tablet (20 mg total) by mouth once daily., Disp: 90 tablet, Rfl: 90    finasteride (PROSCAR) 5 mg tablet, Take 1 tablet (5 mg total) by mouth once daily., Disp: 90 tablet, Rfl: 3    fish oil-omega-3 fatty acids 300-1,000 mg capsule, Take 2 g by mouth once daily.  , Disp: , Rfl:     hydrocodone-acetaminophen 5-325mg (NORCO) 5-325 mg per tablet, Take 1 tablet by mouth every 6 (six) hours as needed., Disp: 120 tablet, Rfl: 0    metoprolol succinate (TOPROL-XL) 12.5 MG 24 hr tablet, Take 12.5 mg by mouth. 1 Tablet Sustained Release 24HR Oral Twice a day , Disp: , Rfl:     metoprolol tartrate (LOPRESSOR) 25 MG tablet, TAKE 1/2 TABLET BY MOUTH TWICE A DAY, Disp: 90 tablet, Rfl: 3    mirtazapine (REMERON) 45 MG tablet, Take 1 tablet (45 mg total) by mouth every evening., Disp: 30 tablet, Rfl: 11    tamsulosin (FLOMAX) 0.4 mg Cp24, TAKE 1 CAPSULE BY MOUTH NIGHTLY, Disp: 30 capsule, Rfl: 11    urea (CARMOL) 40 % Crea, Apply topically 2 (two) times daily., Disp: 199 each, Rfl: 10    warfarin (COUMADIN) 5 MG tablet, TAKE 1 TABLET BY MOUTH EVERY DAY, Disp: 30 tablet, Rfl: 2    econazole nitrate 1 % cream, Apply topically 2 (two) times daily., Disp: 85 g, Rfl: 1    Review of Systems   Constitutional: Negative for activity  change, appetite change, chills, diaphoresis, fatigue, fever and unexpected weight change.   HENT: Negative for congestion, dental problem, hearing loss, mouth sores, postnasal drip, rhinorrhea, sinus pressure and trouble swallowing.    Eyes: Negative for pain, discharge and itching.   Respiratory: Negative for apnea, cough, choking, chest tightness, shortness of breath and wheezing.    Cardiovascular: Negative for chest pain, palpitations and leg swelling.   Gastrointestinal: Negative for abdominal distention, abdominal pain, anal bleeding, blood in stool, constipation, diarrhea, nausea, rectal pain and vomiting.   Endocrine: Negative for polydipsia and polyuria.   Genitourinary: Negative for decreased urine volume, difficulty urinating, discharge, dysuria, enuresis, flank pain, frequency, genital sores, hematuria, penile pain, penile swelling, scrotal swelling, testicular pain and urgency.   Musculoskeletal: Negative for arthralgias, back pain and myalgias.   Skin: Negative for color change, rash and wound.   Neurological: Negative for dizziness, syncope, speech difficulty, light-headedness and headaches.   Hematological: Negative for adenopathy. Does not bruise/bleed easily.   Psychiatric/Behavioral: Negative for behavioral problems, confusion, hallucinations and sleep disturbance.       Objective:      Physical Exam   Constitutional: He appears well-developed.   HENT:   Head: Normocephalic.   Cardiovascular: Normal rate.    Pulmonary/Chest: Effort normal.   Abdominal: Soft.   Genitourinary: Prostate normal.   Genitourinary Comments: Moderate size right hydrocele.  Left testicle is soft and the epididymis feels normal.  I do not feel the epididymis on the right side.  There are no inguinal hernias.   Neurological: He is alert.   Skin: Skin is warm.     Psychiatric: He has a normal mood and affect.       Assessment:       1. Scrotal mass        Plan:       Mariano was seen today for follow-up.    Diagnoses and  all orders for this visit:    Scrotal mass      wl finish antibiotic regimen.  He will return to clinic in 6 weeks.  We discussed the pros and cons of surgical treatment.  I would prefer treating him conservatively as possible since is on Coumadin for CVA in the past.

## 2018-05-16 ENCOUNTER — TELEPHONE (OUTPATIENT)
Dept: UROLOGY | Facility: CLINIC | Age: 83
End: 2018-05-16

## 2018-05-16 NOTE — TELEPHONE ENCOUNTER
----- Message from Allie Ribeiro MA sent at 5/15/2018  3:34 PM CDT -----  Contact: Home: [phone] ok to call wednesday       Who Called: Patient     Communication Preference (MyChart response to Pt. (or) Call Back # and timeframe): call back # [phone]  Additional Information: Pt had an appt today and  forgot to ask if he should continue taking the finasteride that was prescribed by Dr Jacobs?

## 2018-07-02 ENCOUNTER — OFFICE VISIT (OUTPATIENT)
Dept: PODIATRY | Facility: CLINIC | Age: 83
End: 2018-07-02
Payer: MEDICARE

## 2018-07-02 VITALS
WEIGHT: 119 LBS | DIASTOLIC BLOOD PRESSURE: 50 MMHG | HEIGHT: 63 IN | BODY MASS INDEX: 21.09 KG/M2 | SYSTOLIC BLOOD PRESSURE: 104 MMHG

## 2018-07-02 DIAGNOSIS — L60.2 ONYCHAUXIS: Primary | ICD-10-CM

## 2018-07-02 PROCEDURE — 99499 UNLISTED E&M SERVICE: CPT | Mod: S$GLB,,, | Performed by: PODIATRIST

## 2018-07-02 PROCEDURE — 99999 PR PBB SHADOW E&M-EST. PATIENT-LVL II: CPT | Mod: PBBFAC,,, | Performed by: PODIATRIST

## 2018-07-02 PROCEDURE — 17999 UNLISTD PX SKN MUC MEMB SUBQ: CPT | Mod: CSM,S$GLB,, | Performed by: PODIATRIST

## 2018-07-02 RX ORDER — CEPHALEXIN 500 MG/1
CAPSULE ORAL
Refills: 0 | COMMUNITY
Start: 2018-05-11 | End: 2018-10-09

## 2018-08-22 ENCOUNTER — PES CALL (OUTPATIENT)
Dept: ADMINISTRATIVE | Facility: CLINIC | Age: 83
End: 2018-08-22

## 2018-08-23 RX ORDER — MIRTAZAPINE 45 MG/1
45 TABLET, FILM COATED ORAL NIGHTLY
Qty: 30 TABLET | Refills: 11 | Status: SHIPPED | OUTPATIENT
Start: 2018-08-23 | End: 2019-02-26 | Stop reason: SDUPTHER

## 2018-08-29 ENCOUNTER — TELEPHONE (OUTPATIENT)
Dept: PODIATRY | Facility: CLINIC | Age: 83
End: 2018-08-29

## 2018-08-29 NOTE — TELEPHONE ENCOUNTER
Spoke with patient. Requesting to reschedule appointment scheduled 9-4-18.     Appointment rescheduled to 10-4-18    Appointment slip mailed to patient

## 2018-08-29 NOTE — TELEPHONE ENCOUNTER
----- Message from Sesar Flores sent at 8/29/2018  2:28 PM CDT -----  Contact: Self/[phone]  Patient would like to speak to the staff regarding his upcoming appointment. He did not leave a detailed message. Thank you.

## 2018-09-18 ENCOUNTER — TELEPHONE (OUTPATIENT)
Dept: PODIATRY | Facility: CLINIC | Age: 83
End: 2018-09-18

## 2018-09-18 NOTE — TELEPHONE ENCOUNTER
Left message to voicemail [phone] to return call to clinic re: appointment time changed as requested

## 2018-09-18 NOTE — TELEPHONE ENCOUNTER
----- Message from Glenn Minor sent at 9/18/2018 11:30 AM CDT -----  Contact: self  Pt requesting an 11:00 am appointment instead of 2pm on 10.4.18. Contact pt [phone]

## 2018-09-19 ENCOUNTER — PES CALL (OUTPATIENT)
Dept: ADMINISTRATIVE | Facility: CLINIC | Age: 83
End: 2018-09-19

## 2018-09-25 NOTE — TELEPHONE ENCOUNTER
Tell pharmacy the medications are both for sleep and they are of different classes and is okay for patient to take both, that was the intention of the prescriptions

## 2018-09-25 NOTE — TELEPHONE ENCOUNTER
----- Message from Nadeen Lund sent at 9/24/2018  1:17 PM CDT -----  Contact: self  Pt calling to find out why he is taking the following medications. Please call [phone].    chlordiazepoxide (LIBRIUM) 10 MG capsule  mirtazapine (REMERON) 45 MG tablet

## 2018-09-25 NOTE — TELEPHONE ENCOUNTER
Stated, was told by pharmacy that he should not be on both medications at the same time Remeron and Librium. Patient said medication does indeed put him in a deep sleep. Would like to know if he should continued taking both and/or should one be discontinued.

## 2018-09-30 ENCOUNTER — OFFICE VISIT (OUTPATIENT)
Dept: URGENT CARE | Facility: CLINIC | Age: 83
End: 2018-09-30
Payer: MEDICARE

## 2018-09-30 VITALS
DIASTOLIC BLOOD PRESSURE: 81 MMHG | BODY MASS INDEX: 21.09 KG/M2 | SYSTOLIC BLOOD PRESSURE: 159 MMHG | HEART RATE: 65 BPM | OXYGEN SATURATION: 98 % | WEIGHT: 119 LBS | TEMPERATURE: 99 F | HEIGHT: 63 IN

## 2018-09-30 DIAGNOSIS — M54.2 NECK PAIN ON RIGHT SIDE: ICD-10-CM

## 2018-09-30 PROCEDURE — 1101F PT FALLS ASSESS-DOCD LE1/YR: CPT | Mod: CPTII,S$GLB,, | Performed by: NURSE PRACTITIONER

## 2018-09-30 PROCEDURE — 99214 OFFICE O/P EST MOD 30 MIN: CPT | Mod: S$GLB,,, | Performed by: NURSE PRACTITIONER

## 2018-09-30 RX ORDER — TIZANIDINE 2 MG/1
2 TABLET ORAL EVERY 6 HOURS PRN
Qty: 20 TABLET | Refills: 0 | Status: SHIPPED | OUTPATIENT
Start: 2018-09-30 | End: 2018-10-10

## 2018-09-30 NOTE — PROGRESS NOTES
"Subjective:       Patient ID: Mariano Clay Jr. is a 88 y.o. male.    Vitals:  height is 5' 3" (1.6 m) and weight is 54 kg (119 lb). His temperature is 98.7 °F (37.1 °C). His blood pressure is 159/81 (abnormal) and his pulse is 65. His oxygen saturation is 98%.     Chief Complaint: Neck Pain and Weakness    Pt states that he has been feeling weak for a while. His pressure has been fluctuating going high and low.       Neck Pain    This is a new problem. The current episode started more than 1 month ago. The problem occurs intermittently. The problem has been unchanged. The pain is associated with an unknown factor. The pain is present in the right side. The quality of the pain is described as aching. The pain is mild. The symptoms are aggravated by twisting and position. The pain is same all the time. Associated symptoms include numbness, tingling and weakness. Pertinent negatives include no chest pain, fever or headaches. Treatments tried: salonpas  The treatment provided mild relief.   Fatigue   This is a new problem. The current episode started more than 1 month ago. The problem occurs intermittently. The problem has been unchanged. Associated symptoms include fatigue, neck pain, numbness and weakness. Pertinent negatives include no abdominal pain, chest pain, chills, fever, headaches, nausea, rash, sore throat or vomiting. The symptoms are aggravated by bending, walking and twisting. He has tried nothing for the symptoms.     Review of Systems   Constitution: Positive for fatigue and weakness. Negative for chills and fever.   HENT: Negative for sore throat.    Eyes: Negative for blurred vision.   Cardiovascular: Negative for chest pain.   Respiratory: Negative for shortness of breath.    Skin: Negative for rash.   Musculoskeletal: Positive for neck pain and stiffness. Negative for back pain and joint pain.   Gastrointestinal: Negative for abdominal pain, diarrhea, nausea and vomiting.   Neurological: Positive " for dizziness, light-headedness, loss of balance, numbness and tingling. Negative for headaches.   Psychiatric/Behavioral: The patient is not nervous/anxious.    All other systems reviewed and are negative.      Objective:      Physical Exam   Constitutional: He is oriented to person, place, and time. He appears well-developed and well-nourished. No distress.   HENT:   Head: Normocephalic and atraumatic.   Right Ear: Tympanic membrane normal.   Left Ear: Tympanic membrane normal.   Mouth/Throat: Uvula is midline, oropharynx is clear and moist and mucous membranes are normal.   Neck: Neck supple. Tracheal tenderness present. Decreased range of motion present. No thyroid mass present.       Limited L to R ROM 2/2 pain,    Cardiovascular: Normal rate and normal heart sounds.   No murmur heard.  Pulmonary/Chest: Effort normal and breath sounds normal. No stridor. No respiratory distress. He has no wheezes.   Neurological: He is alert and oriented to person, place, and time.   Skin: Skin is warm and dry.   Vitals reviewed.      Assessment:       1. Neck pain on right side        Plan:         Neck pain on right side  -     tiZANidine (ZANAFLEX) 2 MG tablet; Take 1 tablet (2 mg total) by mouth every 6 (six) hours as needed.  Dispense: 20 tablet; Refill: 0    Will avoid NSAID 2/2 warfarin, will send tizanidine prn, encouraged f/u with PCP.    Follow up with primary care provider if not improved  Go to ER for new, worse or concerning symptoms

## 2018-09-30 NOTE — PATIENT INSTRUCTIONS
Follow up with primary care provider if not improved  Go to ER for new, worse or concerning symptoms    Your Neck Muscles  The muscles in the neck and shoulders need to be strong to hold the neck and head in place. These muscles also help move the neck and shoulders. Your healthcare provider can recommend exercises to help stretch and strengthen your neck muscles.    Date Last Reviewed: 10/2/2015  © 4985-1024 [employer]. [address] [place] [address], [place] [address]. All rights reserved. This information is not intended as a substitute for professional medical care. Always follow your healthcare professional's instructions.

## 2018-10-01 RX ORDER — CHLORDIAZEPOXIDE HYDROCHLORIDE 10 MG/1
CAPSULE, GELATIN COATED ORAL
Qty: 60 CAPSULE | Refills: 1 | Status: SHIPPED | OUTPATIENT
Start: 2018-10-01 | End: 2018-12-14 | Stop reason: SDUPTHER

## 2018-10-09 ENCOUNTER — LAB VISIT (OUTPATIENT)
Dept: LAB | Facility: HOSPITAL | Age: 83
End: 2018-10-09
Attending: INTERNAL MEDICINE
Payer: MEDICARE

## 2018-10-09 ENCOUNTER — OFFICE VISIT (OUTPATIENT)
Dept: FAMILY MEDICINE | Facility: CLINIC | Age: 83
End: 2018-10-09
Payer: MEDICARE

## 2018-10-09 VITALS
WEIGHT: 115.75 LBS | OXYGEN SATURATION: 98 % | HEIGHT: 63 IN | HEART RATE: 70 BPM | TEMPERATURE: 98 F | DIASTOLIC BLOOD PRESSURE: 70 MMHG | BODY MASS INDEX: 20.51 KG/M2 | SYSTOLIC BLOOD PRESSURE: 122 MMHG

## 2018-10-09 DIAGNOSIS — F41.9 CHRONIC ANXIETY: ICD-10-CM

## 2018-10-09 DIAGNOSIS — Z00.00 ROUTINE MEDICAL EXAM: Primary | ICD-10-CM

## 2018-10-09 DIAGNOSIS — F39 MOOD DISORDER: ICD-10-CM

## 2018-10-09 DIAGNOSIS — Z79.01 ANTICOAGULATED ON COUMADIN: ICD-10-CM

## 2018-10-09 DIAGNOSIS — I10 ESSENTIAL HYPERTENSION: ICD-10-CM

## 2018-10-09 DIAGNOSIS — D64.9 ANEMIA, UNSPECIFIED TYPE: ICD-10-CM

## 2018-10-09 DIAGNOSIS — F13.20 SEDATIVE DEPENDENCE: ICD-10-CM

## 2018-10-09 DIAGNOSIS — Z86.73 HISTORY OF STROKE: ICD-10-CM

## 2018-10-09 DIAGNOSIS — I70.1 RAS (RENAL ARTERY STENOSIS): ICD-10-CM

## 2018-10-09 DIAGNOSIS — G89.29 OTHER CHRONIC PAIN: ICD-10-CM

## 2018-10-09 DIAGNOSIS — I48.0 PAROXYSMAL ATRIAL FIBRILLATION: ICD-10-CM

## 2018-10-09 DIAGNOSIS — E78.5 HYPERLIPIDEMIA, UNSPECIFIED HYPERLIPIDEMIA TYPE: ICD-10-CM

## 2018-10-09 DIAGNOSIS — M06.9 RHEUMATOID ARTHRITIS, INVOLVING UNSPECIFIED SITE, UNSPECIFIED RHEUMATOID FACTOR PRESENCE: ICD-10-CM

## 2018-10-09 DIAGNOSIS — I77.9 BILATERAL CAROTID ARTERY DISEASE, UNSPECIFIED TYPE: ICD-10-CM

## 2018-10-09 LAB
ALBUMIN SERPL BCP-MCNC: 3.7 G/DL
ALP SERPL-CCNC: 70 U/L
ALT SERPL W/O P-5'-P-CCNC: 19 U/L
ANION GAP SERPL CALC-SCNC: 4 MMOL/L
AST SERPL-CCNC: 20 U/L
BASOPHILS # BLD AUTO: 0.05 K/UL
BASOPHILS NFR BLD: 0.6 %
BILIRUB SERPL-MCNC: 0.5 MG/DL
BUN SERPL-MCNC: 20 MG/DL
CALCIUM SERPL-MCNC: 8.8 MG/DL
CHLORIDE SERPL-SCNC: 96 MMOL/L
CO2 SERPL-SCNC: 25 MMOL/L
CREAT SERPL-MCNC: 1.5 MG/DL
DIFFERENTIAL METHOD: ABNORMAL
EOSINOPHIL # BLD AUTO: 0.2 K/UL
EOSINOPHIL NFR BLD: 1.8 %
ERYTHROCYTE [DISTWIDTH] IN BLOOD BY AUTOMATED COUNT: 14.1 %
EST. GFR  (AFRICAN AMERICAN): 47.4 ML/MIN/1.73 M^2
EST. GFR  (NON AFRICAN AMERICAN): 41 ML/MIN/1.73 M^2
GLUCOSE SERPL-MCNC: 97 MG/DL
HCT VFR BLD AUTO: 39.7 %
HGB BLD-MCNC: 12.6 G/DL
IMM GRANULOCYTES # BLD AUTO: 0.02 K/UL
IMM GRANULOCYTES NFR BLD AUTO: 0.2 %
LYMPHOCYTES # BLD AUTO: 1.2 K/UL
LYMPHOCYTES NFR BLD: 14.5 %
MCH RBC QN AUTO: 29.6 PG
MCHC RBC AUTO-ENTMCNC: 31.7 G/DL
MCV RBC AUTO: 93 FL
MONOCYTES # BLD AUTO: 0.9 K/UL
MONOCYTES NFR BLD: 10.7 %
NEUTROPHILS # BLD AUTO: 6.1 K/UL
NEUTROPHILS NFR BLD: 72.2 %
NRBC BLD-RTO: 0 /100 WBC
PLATELET # BLD AUTO: 222 K/UL
PMV BLD AUTO: 11.1 FL
POTASSIUM SERPL-SCNC: 3.5 MMOL/L
PROT SERPL-MCNC: 6.8 G/DL
RBC # BLD AUTO: 4.26 M/UL
SODIUM SERPL-SCNC: 125 MMOL/L
TSH SERPL DL<=0.005 MIU/L-ACNC: 1.57 UIU/ML
WBC # BLD AUTO: 8.41 K/UL

## 2018-10-09 PROCEDURE — 36415 COLL VENOUS BLD VENIPUNCTURE: CPT | Mod: PO

## 2018-10-09 PROCEDURE — 99214 OFFICE O/P EST MOD 30 MIN: CPT | Mod: PBBFAC,PO | Performed by: INTERNAL MEDICINE

## 2018-10-09 PROCEDURE — 85025 COMPLETE CBC W/AUTO DIFF WBC: CPT

## 2018-10-09 PROCEDURE — 99397 PER PM REEVAL EST PAT 65+ YR: CPT | Mod: 25,S$PBB,, | Performed by: INTERNAL MEDICINE

## 2018-10-09 PROCEDURE — 90662 IIV NO PRSV INCREASED AG IM: CPT | Mod: PBBFAC,PO

## 2018-10-09 PROCEDURE — 84443 ASSAY THYROID STIM HORMONE: CPT

## 2018-10-09 PROCEDURE — 99214 OFFICE O/P EST MOD 30 MIN: CPT | Mod: 25,S$PBB,, | Performed by: INTERNAL MEDICINE

## 2018-10-09 PROCEDURE — 80053 COMPREHEN METABOLIC PANEL: CPT

## 2018-10-09 PROCEDURE — 99999 PR PBB SHADOW E&M-EST. PATIENT-LVL IV: CPT | Mod: PBBFAC,,, | Performed by: INTERNAL MEDICINE

## 2018-10-09 PROCEDURE — 1101F PT FALLS ASSESS-DOCD LE1/YR: CPT | Mod: CPTII,,, | Performed by: INTERNAL MEDICINE

## 2018-10-09 PROCEDURE — 99499 UNLISTED E&M SERVICE: CPT | Mod: S$GLB,,, | Performed by: INTERNAL MEDICINE

## 2018-10-09 NOTE — PROGRESS NOTES
CHIEF COMPLAINT:  Physical.                                                                                                                     HISTORY OF PRESENT ILLNESS:  An 88 year-old white male previously went to the gym but not doing so much    He simply just does not have an appetite.  Looks like it did lose about 15 lb over the last year but only about 4 lb in the last 5 months.  He lives next to his son who does bring in food.  He does not cook for himself.  Son says he sometimes eats very well.   He is a nonsmoker.  His chestx-ray in 2008 was normal.  Colonoscopy in 2006, normal with next in 10       Years and not likely clinically indicated based upon his age.  He has had PSAs in the past but also not likely indicated to continue to follow the previously normal PSA based upon his age  .                                                                                          REVIEW OF SYSTEMS:  Weight  stable currently   .  No fevers or chills.  No    vision or ENT symptoms.  No dysphagia.  No chest pain, shortness of breath.   No GI or  problems that are new.  No new myalgias or arthralgias.  No     new skin rashes.  All other complete review of systems negative.                                                                                          PAST MEDICAL HISTORY:                                                        1. Hypertension.                                                             2. Hyperlipidemia.                                                           3. Severe knee DJD.                                                          4. Questionable rheumatoid arthritis, the patient presented to our clinic on prednisone 5 mg, started by a rheumatologist Dr. Dao. The patient  denied ever having any hand, DJD or arthritis problems.                      5. Remote history of peptic ulcer disease requiring surgery.                 6. Lumbar disk surgery by Dr. Mckay.                                       7. Tinea corporis.                                                           8. Nonspecific stress echo at [facility] 2000.                                  9. Cardiac angiogram apparently at [facility] with chest pain that was  unremarkable, no report.                                                     10. Chronic anxiety and occasional panic attacks.                            11. ED.                                                                      12. Gout.                                                                    13. Prostatitis/ BPH -Dr Ranjit Reyna                                                         14. Neck pain with numbness in both arms, assessed by Dr. Mackey  15.  Atrial fibrillation.    Sandra                                                 16.  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                                                     17.  Severe carotid disease, 75% right internal carotid, 50% left internal  carotid and 70% left external carotid.     18.   Colonoscopy in 2006, normal                                                                                                                                                                                                                                                        SOCIAL HISTORY: Does not smoke or drink.                                                                                                                  FAMILY HISTORY: Unremarkable.                                   PHYSICAL EXAMINATION:                                                        VITAL SIGNS:   as above    GENERAL:  Pleasant thin male.                                                HEENT:  Conjunctivae clear.  Pupils equal and reactive.  Nose and mouth      clear and moist.  Teeth edentulous.  Impaction on right removed with pick by MD himself                                        NECK:  Supple.  Thyroid nonpalpable.                                          RESPIRATORY:  Effort is good.                                                LUNGS:  All clear.                                                           HEART:  Regular rate and rhythm without murmurs, gallops or rubs.  No        carotid bruits.  No edema.                                                   ABDOMEN:  Soft, nondistended, nontender.  No hepatosplenomegaly.             SKIN:  No rashes.  Warm to touch.                                            EXTREMITIES:  Gait normal.  Digits without clubbing or cyanosis.                                                                                          Labs and x-ray reports reviewed as was colonoscopy.                                                                                                       Cerumen impaction procedure note: after verbal informed consent obtained, physician himself remove the wax from the right ear with an ear pick    Quintana was seen today for cerumen impaction.    Diagnoses and all orders for this visit:    Routine medical exam, we'll update lab work.  Pursuing further colonoscopy and PSA are not appropriate upon his age.  He will continue to be active.   flu shot today                                          Additional evaluation and management issues:    Additionally, patient has numerous other total issues to address today.  We reviewed his prior labs and he has had a history of mild hyperkalemia and CKD which we will reassess.  He has rheumatoid arthritis but his clinical disease appears stable.  He has hyperlipidemia which has been assessed in the past and has been normal so we will defer and likely he has had it checked recently by his cardiologist.  He sees the heart clinic for his atrial fibrillation and his monitoring of his Coumadin.  We'll recheck a CBC on chronic anticoagulation.  He does have a history of stroke and blood pressures under good control and he has had no interval neurological deficits.  He has had  a slight hemoglobin reduction which we will reassess.  His essential hypertension has been under good control.  His weight is been stable but we will check a TSH.  Regarding his chronic pains is 1 prescription of hydrocodone 120 has lasted in 3 months .  He was taking Valium during the day but caused sedation so quit and doing good on celexa.  Librium at night for sleep and he has done so chronically without side effects and he was on this particular pattern of medications prior to being under my care.  CKD III needs repeat.  Continues to follow up with what sounds like an outside vascular who recently checked his carotids and no intervention was planned.  No TIA symptoms.  Also follows up with the [facility] for his Coumadin monitoring.  Recently seen for some right trapezius pain.  He still has some pain and stiffness and discussed the application of heat and that will need more time.  Watch out for side effects of muscle relaxers but apparently no excessive side effects with its current use.  All the separate issues reviewed and patient counseled an evaluation and management will be based upon time counseling.Total time over 25 minutes with over 50% counseling.    Continues to follow up with Dr. Madsen in Ophthalmology for his macular degeneration.  He did get some injections in the past but not currently      Assessment and plan:            Essential hypertension, chronic and stable  -     CBC auto differential; Future  -     TSH; Future  -     Comprehensive metabolic panel; Future    Chronic anxiety, chronic and stable, he does have reduced appetite but his son says it has always been that way he is always not been a big eater but family is assuring that he gets the food that he likes and continues to eat.  Will send lab work to assess for any sign of any underlying problem causing further weight loss.  Encouraged him to eat more.  The consider Remeron but would need to be very cautious with the sedating  affects    Other chronic pain, chronic and stable    Sedative dependence, chronic and stable    Hyperlipidemia, unspecified hyperlipidemia type, presumably followed by Cardiology    Mood disorder    Paroxysmal atrial fibrillation, followed by Cardiology    History of stroke    Rheumatoid arthritis, involving unspecified site, unspecified rheumatoid factor presence, chronic and stable    TO (renal artery stenosis), reassess renal function    Bilateral carotid artery disease, unspecified type, followed by vascular apparently    Anticoagulated on Coumadin  -     CBC auto differential; Future    Anemia, unspecified type  -     CBC auto differential; Future  -     TSH; Future  -     Comprehensive metabolic panel; Future    Other orders  -     Influenza - High Dose (65+) (PF) (IM)

## 2018-10-15 ENCOUNTER — TELEPHONE (OUTPATIENT)
Dept: FAMILY MEDICINE | Facility: CLINIC | Age: 83
End: 2018-10-15

## 2018-10-15 DIAGNOSIS — I10 ESSENTIAL HYPERTENSION: Primary | ICD-10-CM

## 2018-10-15 NOTE — TELEPHONE ENCOUNTER
Call pt and family- -the labs last week showed the sodium, the salt, in the blood was somewhat low and Dr BASILIO wants to repeat it this week and check the urine for salt as well.      How much water and other fluids do you drink on a normal day ??    (the amount and type of fluid important)    Repeat TODAY if possible -no fast

## 2018-10-16 ENCOUNTER — TELEPHONE (OUTPATIENT)
Dept: FAMILY MEDICINE | Facility: CLINIC | Age: 83
End: 2018-10-16

## 2018-10-16 NOTE — TELEPHONE ENCOUNTER
----- Message from Glenn Minor sent at 10/16/2018  4:41 PM CDT -----  Contact: self  Refill: hydrocodone-acetaminophen 5-325mg (NORCO) 5-325 mg per tablet. Pt [phone].

## 2018-10-16 NOTE — TELEPHONE ENCOUNTER
----- Message from Shahab Newman sent at 10/16/2018  4:18 PM CDT -----  Contact: Self/[phone]  The patient is requesting to speak to the staff.      Thank you

## 2018-10-16 NOTE — TELEPHONE ENCOUNTER
Informed patient of information below. Verbalized understanding. Does not drink much ( coke, water, milk).

## 2018-10-17 ENCOUNTER — LAB VISIT (OUTPATIENT)
Dept: LAB | Facility: HOSPITAL | Age: 83
End: 2018-10-17
Attending: INTERNAL MEDICINE
Payer: MEDICARE

## 2018-10-17 DIAGNOSIS — I10 ESSENTIAL HYPERTENSION: ICD-10-CM

## 2018-10-17 LAB
BILIRUB UR QL STRIP: NEGATIVE
CLARITY UR REFRACT.AUTO: CLEAR
COLOR UR AUTO: YELLOW
GLUCOSE UR QL STRIP: NEGATIVE
HGB UR QL STRIP: NEGATIVE
KETONES UR QL STRIP: NEGATIVE
LEUKOCYTE ESTERASE UR QL STRIP: NEGATIVE
MICROSCOPIC COMMENT: NORMAL
NITRITE UR QL STRIP: NEGATIVE
OSMOLALITY UR: 607 MOSM/KG
PH UR STRIP: 5 [PH] (ref 5–8)
PROT UR QL STRIP: NEGATIVE
RBC #/AREA URNS AUTO: 0 /HPF (ref 0–4)
SODIUM UR-SCNC: 63 MMOL/L
SP GR UR STRIP: 1.02 (ref 1–1.03)
SQUAMOUS #/AREA URNS AUTO: 0 /HPF
URN SPEC COLLECT METH UR: NORMAL
UROBILINOGEN UR STRIP-ACNC: NEGATIVE EU/DL
WBC #/AREA URNS AUTO: 0 /HPF (ref 0–5)

## 2018-10-17 PROCEDURE — 84300 ASSAY OF URINE SODIUM: CPT

## 2018-10-17 PROCEDURE — 83935 ASSAY OF URINE OSMOLALITY: CPT

## 2018-10-17 PROCEDURE — 81001 URINALYSIS AUTO W/SCOPE: CPT

## 2018-10-17 RX ORDER — HYDROCODONE BITARTRATE AND ACETAMINOPHEN 5; 325 MG/1; MG/1
1 TABLET ORAL EVERY 6 HOURS PRN
Qty: 120 TABLET | Refills: 0 | Status: SHIPPED | OUTPATIENT
Start: 2018-10-17 | End: 2019-02-20 | Stop reason: SDUPTHER

## 2018-10-18 ENCOUNTER — TELEPHONE (OUTPATIENT)
Dept: FAMILY MEDICINE | Facility: CLINIC | Age: 83
End: 2018-10-18

## 2018-12-05 RX ORDER — CITALOPRAM 20 MG/1
20 TABLET, FILM COATED ORAL DAILY
Qty: 90 TABLET | Refills: 3 | Status: SHIPPED | OUTPATIENT
Start: 2018-12-05 | End: 2019-08-02 | Stop reason: SDUPTHER

## 2018-12-14 RX ORDER — CHLORDIAZEPOXIDE HYDROCHLORIDE 10 MG/1
CAPSULE, GELATIN COATED ORAL
Qty: 60 CAPSULE | Refills: 5 | Status: SHIPPED | OUTPATIENT
Start: 2018-12-14 | End: 2019-06-26 | Stop reason: SDUPTHER

## 2018-12-18 NOTE — TELEPHONE ENCOUNTER
Last ov- 10/9/18   Consent 2/Introductory Paragraph: Mohs surgery was explained to the patient and consent was obtained. The risks, benefits and alternatives to therapy were discussed in detail. Specifically, the risks of infection, scarring, bleeding, prolonged wound healing, incomplete removal, allergy to anesthesia, nerve injury and recurrence were addressed. Prior to the procedure, the treatment site was clearly identified and confirmed by the patient. All components of Universal Protocol/PAUSE Rule completed.

## 2019-01-10 ENCOUNTER — PES CALL (OUTPATIENT)
Dept: ADMINISTRATIVE | Facility: CLINIC | Age: 84
End: 2019-01-10

## 2019-01-27 RX ORDER — TAMSULOSIN HYDROCHLORIDE 0.4 MG/1
CAPSULE ORAL
Qty: 30 CAPSULE | Refills: 11 | Status: SHIPPED | OUTPATIENT
Start: 2019-01-27 | End: 2019-04-09 | Stop reason: SDUPTHER

## 2019-01-29 ENCOUNTER — TELEPHONE (OUTPATIENT)
Dept: FAMILY MEDICINE | Facility: CLINIC | Age: 84
End: 2019-01-29

## 2019-01-29 NOTE — TELEPHONE ENCOUNTER
----- Message from Lo Osborne sent at 1/28/2019  9:03 AM CST -----  Contact: self- [phone]  Pt states he is having trouble with pharmacy for ---chlordiazepoxide (LIBRIUM) 10 MG capsule. He states pharmacy does not have it on their list and would like a call from the doctor's office for an alternate or discuss.    [employer] [phone]

## 2019-02-07 RX ORDER — FINASTERIDE 5 MG/1
TABLET, FILM COATED ORAL
Qty: 90 TABLET | Refills: 3 | OUTPATIENT
Start: 2019-02-07

## 2019-02-07 RX ORDER — FINASTERIDE 5 MG/1
TABLET, FILM COATED ORAL
Qty: 90 TABLET | Refills: 12 | Status: SHIPPED | OUTPATIENT
Start: 2019-02-07 | End: 2019-07-25 | Stop reason: SDUPTHER

## 2019-02-11 ENCOUNTER — TELEPHONE (OUTPATIENT)
Dept: FAMILY MEDICINE | Facility: CLINIC | Age: 84
End: 2019-02-11

## 2019-02-11 NOTE — TELEPHONE ENCOUNTER
----- Message from Daniela Tran sent at 2/8/2019  4:00 PM CST -----  Contact: Self/ [phone]  Pt requesting call from Dr. Cortez in regards to medication (chlordiazepoxide (LIBRIUM) 10 MG capsule). Please call to advise. Thank you

## 2019-02-11 NOTE — TELEPHONE ENCOUNTER
----- Message from Michelle Varela sent at 2/8/2019  4:21 PM CST -----  Contact: self  Pt is calling to get an envelope addressed to us so he can send paperwork to us from his insurance. Please call pt at [phone]

## 2019-02-19 NOTE — TELEPHONE ENCOUNTER
----- Message from Jimi Lopez sent at 2/19/2019 10:14 AM CST -----  Contact: MARK [phone]  REFILL: chlordiazepoxide (LIBRIUM) 10 MG capsule,HYDROcodone-acetaminophen (NORCO) 5-325 mg per tablet    PHARMACY: [facility]/PHARMACY #[id number] [place] [address]

## 2019-02-20 RX ORDER — CHLORDIAZEPOXIDE HYDROCHLORIDE 10 MG/1
CAPSULE, GELATIN COATED ORAL
Qty: 60 CAPSULE | Refills: 12 | Status: CANCELLED | OUTPATIENT
Start: 2019-02-20

## 2019-02-20 NOTE — TELEPHONE ENCOUNTER
----- Message from Marissa Villegas sent at 2/20/2019  3:27 PM CST -----  Contact: Self   Patient is asking to speak with Ms.Grecia to thank her. Please call at [phone]

## 2019-02-20 NOTE — TELEPHONE ENCOUNTER
----- Message from Sesar Flores sent at 2/20/2019 12:58 PM CST -----  Contact: Self/[phone]  Patient would like the staff to call him once his prescriptions are approved. Thank you.

## 2019-02-21 RX ORDER — HYDROCODONE BITARTRATE AND ACETAMINOPHEN 5; 325 MG/1; MG/1
1 TABLET ORAL EVERY 6 HOURS PRN
Qty: 120 TABLET | Refills: 0 | Status: SHIPPED | OUTPATIENT
Start: 2019-02-21 | End: 2019-05-20 | Stop reason: SDUPTHER

## 2019-02-26 RX ORDER — MIRTAZAPINE 45 MG/1
45 TABLET, FILM COATED ORAL NIGHTLY
Qty: 30 TABLET | Refills: 12 | Status: SHIPPED | OUTPATIENT
Start: 2019-02-26 | End: 2020-01-06

## 2019-02-26 NOTE — TELEPHONE ENCOUNTER
----- Message from Lo Osborne sent at 2/26/2019  2:07 PM CST -----  Contact: self - [phone]  Pt asking for a call back to ask if Dr. BASILIO will approve medication that Ot[facility] sent over to office 3-4 day ago. Pt not sure of the name of the medication but states he takes 2 pills before he goes to sleep.

## 2019-03-04 ENCOUNTER — TELEPHONE (OUTPATIENT)
Dept: FAMILY MEDICINE | Facility: CLINIC | Age: 84
End: 2019-03-04

## 2019-03-04 NOTE — TELEPHONE ENCOUNTER
----- Message from Yamini Sarabia sent at 3/4/2019  9:22 AM CST -----  Contact: self [phone]  Pt was told by the Pharmacy that his refill for mirtazapine (REMERON) 45 MG tablet was denied by Dr. Cortez and would like to know why.  .  [facility]/pharmacy #[id number] [place] [address] [address] [address]  Phone: [phone] Fax: [phone]

## 2019-03-06 ENCOUNTER — TELEPHONE (OUTPATIENT)
Dept: FAMILY MEDICINE | Facility: CLINIC | Age: 84
End: 2019-03-06

## 2019-03-06 NOTE — TELEPHONE ENCOUNTER
----- Message from Annabella Hernandez sent at 3/6/2019  1:17 PM CST -----  Contact: pt  Name of Who is Calling: pt      What is the request in detail: pt wants to know if the doctor prescribed him a new medication. Call pt      Can the clinic reply by [facility]: no      What Number to [facility] Back [facility]: [phone]

## 2019-03-06 NOTE — TELEPHONE ENCOUNTER
"Informed that PCP is out of the office until Monday 3/11/19. Stated, was told by [facility] he could not be on both medications Librium and Remeron. He was instructed by the Pharmacy to stop taking Remeron. He had to pay out-of-pocket for Librium (Insurance does not pay for this medication). He said, the Librium does not work. Patient reported being up until 1:00 am. He wanted to know why PCP stopped the other medication Remeron. He takes Librium around 8:00 pm. Then, Remeron around 9:30. Reported sleeping well on both medication. Requesting a new script for Remeron to go to [facility]. At visit 10/09/18 per note " He was taking Valium during the day but caused sedation so quit and doing good on celexa." At this time per note "The consider Remeron but would need to be very cautious with the sedating affects." Please review office notes 10/9/18 and advise.  "

## 2019-03-06 NOTE — TELEPHONE ENCOUNTER
----- Message from Shae Zepeda sent at 3/4/2019  4:22 PM CST -----  Contact: Self  Patient returned call to office. Please call to advise at [phone]

## 2019-03-07 ENCOUNTER — TELEPHONE (OUTPATIENT)
Dept: FAMILY MEDICINE | Facility: CLINIC | Age: 84
End: 2019-03-07

## 2019-03-07 NOTE — TELEPHONE ENCOUNTER
Pt states he called to let us know [employer] got the rx out in the mail to him and he should receive tomorrow Informed pt if he has any issues with receiving to call the office.

## 2019-03-07 NOTE — TELEPHONE ENCOUNTER
----- Message from Daniela Tran sent at 3/6/2019  3:43 PM CST -----  Contact: Self: [phone]  Patient called in regards to concerns he has about medication.  #mirtazapine (REMERON) 45 MG tablet    Please call to advise, Thank you

## 2019-04-09 RX ORDER — TAMSULOSIN HYDROCHLORIDE 0.4 MG/1
1 CAPSULE ORAL NIGHTLY
Qty: 90 CAPSULE | Refills: 12 | Status: SHIPPED | OUTPATIENT
Start: 2019-04-09

## 2019-04-09 NOTE — TELEPHONE ENCOUNTER
----- Message from Kalyn Brady sent at 4/9/2019  9:44 AM CDT -----  Contact: self  [phone]  Pt is requesting to speak to you,  he would not say what this is about. Pls call pt [phone], Thanks.....Paola

## 2019-05-03 ENCOUNTER — TELEPHONE (OUTPATIENT)
Dept: FAMILY MEDICINE | Facility: CLINIC | Age: 84
End: 2019-05-03

## 2019-05-03 NOTE — TELEPHONE ENCOUNTER
----- Message from Bhavani Tanner sent at 5/3/2019  3:37 PM CDT -----  Contact: pt  Name of Who is Calling: MARK MCKEON JR. [[id number]]    What is the request in detail: Patient is requesting a call back in regards to RX  HYDROcodone-acetaminophen (NORCO) 5-325 mg per tablet, Patient states he has a few questions.....Please contact to further discuss and advise      Can the clinic reply by [facility]: No     What Number to Call Back [facility] not [facility]: [phone].

## 2019-05-21 RX ORDER — HYDROCODONE BITARTRATE AND ACETAMINOPHEN 5; 325 MG/1; MG/1
1 TABLET ORAL EVERY 6 HOURS PRN
Qty: 120 TABLET | Refills: 0 | Status: SHIPPED | OUTPATIENT
Start: 2019-05-21 | End: 2019-07-30 | Stop reason: SDUPTHER

## 2019-05-24 ENCOUNTER — TELEPHONE (OUTPATIENT)
Dept: FAMILY MEDICINE | Facility: CLINIC | Age: 84
End: 2019-05-24

## 2019-05-24 NOTE — TELEPHONE ENCOUNTER
----- Message from Esperanza Mcintyre sent at 5/24/2019 10:09 AM CDT -----  Contact: Self/ [phone]  Type: Patient Call Back    Who called:  Patient    What is the request in detail:  Patient would like to know if he can take Vitamin D with the medication he's on.  Thank you.    Would the patient rather a call back or a response via [facility]?   Call back    Best call back number: [phone]

## 2019-05-24 NOTE — TELEPHONE ENCOUNTER
Please find out why patient is needing vitamin supplementation. I do not see any recent Vitamin D levels in lab results indicating deficiency. If he would like us to check his levels we can placed an order?     Thank you!

## 2019-06-19 ENCOUNTER — TELEPHONE (OUTPATIENT)
Dept: PODIATRY | Facility: CLINIC | Age: 84
End: 2019-06-19

## 2019-06-19 NOTE — TELEPHONE ENCOUNTER
----- Message from Lo Osborne sent at 6/18/2019  2:27 PM CDT -----  ..Type: Patient Call Back    Who called: pt     What is the request in detail: pt asking for a call back from nurse. Pt would not disclose the reason for the call.     Can the clinic reply by [facility]? No     Would the patient rather a call back or a response via [facility]? Call back     Best call back number: [phone]    Additional Information:

## 2019-06-19 NOTE — TELEPHONE ENCOUNTER
Spoke to patient and wanted to make appt with Dr Montero next week as a PROC B $42. Will call back what day and time works for him.

## 2019-06-27 RX ORDER — CHLORDIAZEPOXIDE HYDROCHLORIDE 10 MG/1
CAPSULE, GELATIN COATED ORAL
Qty: 60 CAPSULE | Refills: 5 | Status: SHIPPED | OUTPATIENT
Start: 2019-06-27 | End: 2020-03-02 | Stop reason: SDUPTHER

## 2019-07-25 NOTE — TELEPHONE ENCOUNTER
----- Message from Karla Yang sent at 7/25/2019  1:50 PM CDT -----  Contact: Self   Type: RX Refill Request    Who Called:  Self     Refill or New Rx: Refill     RX Name and Strength: finasteride (PROSCAR) 5 mg tablet    Preferred Pharmacy with phone number:[facility] - [facility] [address] [phone] (Phone)  [phone] (Fax)        Local or Mail Order: Mail Order     Ordering Provider: Dr. Ranjit Barahona    Would the patient rather a call back or a response via [facility]?  Call     Best Call Back Number:[phone]

## 2019-07-26 RX ORDER — HYDROCODONE BITARTRATE AND ACETAMINOPHEN 5; 325 MG/1; MG/1
1 TABLET ORAL EVERY 6 HOURS PRN
Qty: 120 TABLET | Refills: 0 | Status: CANCELLED | OUTPATIENT
Start: 2019-07-26

## 2019-07-26 RX ORDER — FINASTERIDE 5 MG/1
5 TABLET, FILM COATED ORAL DAILY
Qty: 90 TABLET | Refills: 0 | Status: SHIPPED | OUTPATIENT
Start: 2019-07-26 | End: 2019-08-05 | Stop reason: SDUPTHER

## 2019-07-26 NOTE — TELEPHONE ENCOUNTER
----- Message from Jimi Lopez sent at 7/26/2019  9:52 AM CDT -----  Contact: Mariano [phone]  Type: RX Refill Request    Who Called: Mariano    Refill or New Rx:refill     RX Name and Strength:HYDROcodone-acetaminophen (NORCO) 5-325 mg per tablet    Is this a 30 day or 90 day RX:30 day    Preferred Pharmacy with phone number:[facility]/PHARMACY #[id number] [address]    Would the patient rather a call back or a response via [facility]? Call back    Best Call Back Number:[phone]

## 2019-07-29 ENCOUNTER — TELEPHONE (OUTPATIENT)
Dept: INFECTIOUS DISEASES | Facility: CLINIC | Age: 84
End: 2019-07-29

## 2019-07-29 NOTE — TELEPHONE ENCOUNTER
----- Message from Alma Salinas sent at 7/26/2019  5:16 PM CDT -----  Contact: Refill Medication  Type:  Patient requesting a refill:    Who Called:Danna  RX refill : HYDROcodone-acetaminophen (NORCO) 5-325 mg per tablet  Refill amt: 120 pills  Would the patient rather a call back   Best Call Back Number: [phone]    [facility] in [place] phone [phone]  Additional Information: Thank you!

## 2019-07-30 RX ORDER — HYDROCODONE BITARTRATE AND ACETAMINOPHEN 5; 325 MG/1; MG/1
1 TABLET ORAL EVERY 6 HOURS PRN
Qty: 120 TABLET | Refills: 0 | Status: SHIPPED | OUTPATIENT
Start: 2019-07-30 | End: 2019-10-04 | Stop reason: SDUPTHER

## 2019-07-30 NOTE — TELEPHONE ENCOUNTER
Thanks, please check if we already got a refill request, when last refill came in and so forth, review allergies

## 2019-07-30 NOTE — TELEPHONE ENCOUNTER
----- Message from Rosa Melo sent at 7/30/2019  8:47 AM CDT -----  Contact: Self  Type: Patient Call Back    Who called:self    What is the request in detail: HYDROcodone-acetaminophen (NORCO) 5-325 mg per tablet    Can the clinic reply by [facility]? no  Would the patient rather a call back or a response via [facility]? call    Best call back number: [phone]    [facility]/pharmacy #[address] [place] [address] [phone] (Phone)  [phone] (Fax)

## 2019-08-02 RX ORDER — CITALOPRAM 20 MG/1
20 TABLET, FILM COATED ORAL DAILY
Qty: 90 TABLET | Refills: 3 | Status: SHIPPED | OUTPATIENT
Start: 2019-08-02 | End: 2020-02-07

## 2019-08-02 NOTE — TELEPHONE ENCOUNTER
----- Message from Arron Yang sent at 8/2/2019 12:59 PM CDT -----  Contact: [facility]  Type:  Pharmacy Calling to Clarify an RX    Name of Caller: Keri    Pharmacy Name: [employer]    Prescription Name: Citalopram 20mg  Sinasteride 5mg    What do they need to clarify? She needs to request a prescription on patient's behalf.    Best Call Back Number: [phone]

## 2019-08-05 RX ORDER — FINASTERIDE 5 MG/1
5 TABLET, FILM COATED ORAL DAILY
Qty: 90 TABLET | Refills: 12 | Status: SHIPPED | OUTPATIENT
Start: 2019-08-05 | End: 2020-09-10

## 2019-08-05 NOTE — TELEPHONE ENCOUNTER
----- Message from Elinor Chambers sent at 8/2/2019  4:20 PM CDT -----  Contact: Patient  Type: RX Refill Request    Who Called: Patient    Refill or New Rx: Refill    RX Name and Strength: finasteride (PROSCAR) 5 mg tablet    How is the patient currently taking it? (ex. 1XDay): 1x    Is this a 30 day or 90 day RX: 90    Preferred Pharmacy with phone number: [facility] - [address] [phone] (Phone)  [phone] (Fax)        Local or Mail Order: Mail    Ordering Provider: Dr. Cortez    Would the patient rather a call back or a response [facility]? Call back     Best Call Back Number: [phone]    Additional Information:

## 2019-10-04 RX ORDER — HYDROCODONE BITARTRATE AND ACETAMINOPHEN 5; 325 MG/1; MG/1
1 TABLET ORAL EVERY 6 HOURS PRN
Qty: 120 TABLET | Refills: 0 | Status: SHIPPED | OUTPATIENT
Start: 2019-10-04 | End: 2020-03-02 | Stop reason: SDUPTHER

## 2019-10-04 NOTE — TELEPHONE ENCOUNTER
----- Message from Karla Yang sent at 10/4/2019 10:05 AM CDT -----  Contact: Self   Type: RX Refill Request    Who Called:  Self     Have you contacted your pharmacy: no     Refill or New Rx: Refill     RX Name and Strength: HYDROcodone-acetaminophen (NORCO) 5-325 mg per tablet      Preferred Pharmacy with phone number:[facility]/pharmacy [id number] [phone] (Phone)  [phone] (Fax)        Local or Mail Order: Local     Ordering Provider: Dr. Cortez     Would the patient rather a call back or a response via [facility]?  Call     Best Call Back Number:[phone]

## 2019-10-14 ENCOUNTER — TELEPHONE (OUTPATIENT)
Dept: FAMILY MEDICINE | Facility: CLINIC | Age: 84
End: 2019-10-14

## 2019-10-14 NOTE — TELEPHONE ENCOUNTER
----- Message from Yara Servin sent at 10/14/2019  9:19 AM CDT -----  Contact: Self   Type: Patient Call Back    What is the request in detail: Pt calling to speak to a nurse regarding back pain.    Can the clinic reply by [facility]? No    Would the patient rather a call back or a response via [facility]? Call back     Best call back number: [phone]

## 2019-10-15 ENCOUNTER — TELEPHONE (OUTPATIENT)
Dept: FAMILY MEDICINE | Facility: CLINIC | Age: 84
End: 2019-10-15

## 2019-10-15 RX ORDER — METHYLPREDNISOLONE 4 MG/1
TABLET ORAL
Qty: 1 PACKAGE | Refills: 0 | Status: SHIPPED | OUTPATIENT
Start: 2019-10-15

## 2019-10-15 NOTE — TELEPHONE ENCOUNTER
Informed Patient that I spoke with Dr. Cortez and he suggested a visit to the ED if the pain is new onset and excruciating or schedule an annual F/U before any new pain medication may be Rx'ed.  Patient said that the pain has been ongoing in his back and Rt. leg for about a week but it's not so bad that he needs to go the the ED.  He also said that he will need to speak with his son to see when he'll be able to bring him before he commits to an appointment.

## 2019-10-15 NOTE — TELEPHONE ENCOUNTER
Spoke with son polina to inform he must listed in patient record before we can speak with concerning patient care, verbalized understanding

## 2019-10-15 NOTE — TELEPHONE ENCOUNTER
----- Message from Annabella Hernandez sent at 10/15/2019  8:47 AM CDT -----  Contact: pt's priscilla fish [phone]  Type: Patient Call Back    Who called:pt's priscilla fish [phone]    What is the request in detail:son is requesting something for his dad for pain. Please call priscilla fish    Can the clinic reply by [facility]?    Would the patient rather a call back or a response via [facility]? Call back    Best call back number:pt's priscilla fish [phone]    Additional Information:

## 2019-10-15 NOTE — TELEPHONE ENCOUNTER
----- Message from Luzmaria Renae sent at 10/15/2019  1:05 PM CDT -----  Contact: Greg Holt [phone]  Type:  Patient Returning Call    Who Called: Greg Holt     Who Left Message for Patient: Not sure    Does the patient know what this is regarding?: Patient having back spasms with sciatic nerve. In pain, can't move. Need medication called in to pharmacy.     Would the patient rather a call back or a response via [facility]? Call back    Best Call Back Number: [phone]

## 2019-10-15 NOTE — TELEPHONE ENCOUNTER
Patient said hat he has new onset sciatic nerve pain and the Norco 2-325 mg is not relieving it.  Patient is requesting that Dr. Cortez order something different for this type of pain.  I told Patient that he should make an appointment to eval. his symptoms as he hasn't been seen in over a year but he said that the pain is so bad that he can't walk and asked again that I ask Dr. Cortez to call in something to [facility] at [phone].

## 2019-10-15 NOTE — TELEPHONE ENCOUNTER
"Please advise patient that ......."we do not want you to be in pain but it is very difficult to treat without seeing you and without specific knowledge and details of the symptoms    For acute sciatica we always give people a course of steroids and so we will send a steroid pack to the pharmacy.  This should reduce inflammation and then the pain should get better    Unfortunately, we really cannot adjust or increase the pain medications BUT temporarily you can try taking a pill and a half at a time    Medrol Dosepak sent to the [facility]  "

## 2019-10-15 NOTE — TELEPHONE ENCOUNTER
Returned Patient's son's call to inform him that he is not listed as a contact to participate in Patient's care.  No answer.  Left voice message requesting a call back.

## 2019-10-16 ENCOUNTER — TELEPHONE (OUTPATIENT)
Dept: FAMILY MEDICINE | Facility: CLINIC | Age: 84
End: 2019-10-16

## 2019-10-16 NOTE — TELEPHONE ENCOUNTER
Patient informed of message below.  He verbalized understanding and said that he already started the Medrol dose pack.

## 2019-10-23 ENCOUNTER — TELEPHONE (OUTPATIENT)
Dept: FAMILY MEDICINE | Facility: CLINIC | Age: 84
End: 2019-10-23

## 2019-10-23 NOTE — TELEPHONE ENCOUNTER
----- Message from Rosa Melo sent at 10/23/2019  2:18 PM CDT -----  Type: Patient Call Back    Who called:Self  What is the request in detail: patient would like to know if he can use a heating pad for his sciatica    Can the clinic reply by [facility]? no    Would the patient rather a call back or a response via [facility]? call    Best call back number: [phone]

## 2019-10-23 NOTE — TELEPHONE ENCOUNTER
Again, as I think we said in a prior message, we really have not seen him in a year so is difficult to say if he is really having sciatica are not.    If indeed he has pulled some muscles, then heating pad will help so apply it to any area in question if it helps he can continue.  Do not fall asleep without on and so forth so as to reduce chances of burning self

## 2019-10-29 ENCOUNTER — TELEPHONE (OUTPATIENT)
Dept: FAMILY MEDICINE | Facility: CLINIC | Age: 84
End: 2019-10-29

## 2019-10-29 NOTE — TELEPHONE ENCOUNTER
----- Message from Jimi Lopez sent at 10/29/2019  1:14 PM CDT -----  Contact: Mariano [phone]  Type:  Patient Requesting Referral    Who Called: Mariano     Referral to What Specialty:urology     Reason for Referral:(#patient did not state the reason the referral was for, patient wanted to speak to the nurse in regards to the issue#)    Does the patient want the referral with a specific physician?:no    [facility]     Would the patient rather a call back or a response via [facility]? Call back     Best Call Back Number:  [phone]

## 2019-10-30 NOTE — TELEPHONE ENCOUNTER
----- Message from Luzmaria Renae sent at 10/30/2019 10:53 AM CDT -----  Contact: Patient ph [phone]  Type:  Patient Returning Call    Who Called: Patient    Who Left Message for Patient: Quintin MCKEON    Does the patient know what this is regarding?: No     Would the patient rather a call back or a response via [facility]? Call back    Best Call Back Number: [phone]

## 2019-10-31 ENCOUNTER — TELEPHONE (OUTPATIENT)
Dept: FAMILY MEDICINE | Facility: CLINIC | Age: 84
End: 2019-10-31

## 2019-10-31 NOTE — TELEPHONE ENCOUNTER
----- Message from Quintin Santoro MA sent at 10/29/2019  6:14 PM CDT -----  Contact: Mariano [phone]      ----- Message -----  From: Jimi Lopez  Sent: 10/29/2019   1:14 PM CDT  To: Dana CAIN Staff    Type:  Patient Requesting Referral    Who Called: Mariano     Referral to What Specialty:urology     Reason for Referral:(#patient did not state the reason the referral was for, patient wanted to speak to the nurse in regards to the issue#)    Does the patient want the referral with a specific physician?:no    [facility]     Would the patient rather a call back or a response via [facility]? Call back     Best Call Back Number:  [phone]

## 2019-11-07 ENCOUNTER — TELEPHONE (OUTPATIENT)
Dept: FAMILY MEDICINE | Facility: CLINIC | Age: 84
End: 2019-11-07

## 2019-11-07 DIAGNOSIS — N50.89 ENLARGED TESTICLE: Primary | ICD-10-CM

## 2019-11-07 NOTE — TELEPHONE ENCOUNTER
----- Message from Esperanza Mcintyre sent at 11/7/2019  9:36 AM CST -----  Contact: Ray/ Son/  [phone]  Type: Patient Call Back    Who called: Anjel    What is the request in detail:  Patient is needing a reffal for Urology.  His son would like parient to go to Dr Doan at [facility]    Would the patient rather a call back or a response via [facility]?   Call back    Best call back number: [phone]

## 2019-11-07 NOTE — TELEPHONE ENCOUNTER
No problem but always need a reason/diagnosis to put in a referral.  Also okay to key up the urology referral and put the requested physician's name in the box and possibly even link to the diagnosis given by the patient    That would speed things up greatly, thanks

## 2019-11-14 ENCOUNTER — TELEPHONE (OUTPATIENT)
Dept: FAMILY MEDICINE | Facility: CLINIC | Age: 84
End: 2019-11-14

## 2019-11-14 NOTE — TELEPHONE ENCOUNTER
I spoke with the patient regarding a referral to Urology, he has been scheduled with Fabricio Flores at [place] November 19

## 2020-01-06 RX ORDER — MIRTAZAPINE 45 MG/1
45 TABLET, FILM COATED ORAL NIGHTLY
Qty: 90 TABLET | Refills: 12 | Status: SHIPPED | OUTPATIENT
Start: 2020-01-06 | End: 2021-05-25

## 2020-01-08 ENCOUNTER — TELEPHONE (OUTPATIENT)
Dept: FAMILY MEDICINE | Facility: CLINIC | Age: 85
End: 2020-01-08

## 2020-01-08 NOTE — TELEPHONE ENCOUNTER
----- Message from Daniela Tran sent at 1/8/2020  3:13 PM CST -----  Contact: Self: [phone]  citalopram (CELEXA) 20 MG tablet  mirtazapine (REMERON) 45 MG tablet    Patient called regarding medications listed above. He went to see his cardiologist and had complaints of weakness and dizziness. Cardiologist informed him it may have something to do with the medication he  was prescribed by Dr. Cortez.

## 2020-01-09 ENCOUNTER — TELEPHONE (OUTPATIENT)
Dept: FAMILY MEDICINE | Facility: CLINIC | Age: 85
End: 2020-01-09

## 2020-01-09 NOTE — TELEPHONE ENCOUNTER
Please call patient, state to patient    Dr. BASILIO does not have the details of the current dizziness but it is unlikely that the two medications are making you dizzy.    If you want to hold one of the medications, you could hold the Remeron 1st and assess if the dizziness goes away but continue the Celexa.    Probably best that you schedule an appointment with Dr. BASILIO to talk about the details of the dizziness as that may be the only way we can figure things out.  Dr. BASILIO says the last time you saw him was October of 2018 so it has been over a year

## 2020-01-09 NOTE — TELEPHONE ENCOUNTER
----- Message from Lo Osborne sent at 1/9/2020  3:09 PM CST -----  Type:  Patient Returning Call    Who Called: pt     Who Left Message for Patient: Paloma    Does the patient know what this is regarding?:     Would the patient rather a call back or a response via [facility]? Call back     Best Call Back Number: [phone]    Additional Information:

## 2020-01-09 NOTE — TELEPHONE ENCOUNTER
Patient states heart doctor informed him the following meds are making him dizzy remeron and celexa. Please advise

## 2020-01-10 ENCOUNTER — TELEPHONE (OUTPATIENT)
Dept: FAMILY MEDICINE | Facility: CLINIC | Age: 85
End: 2020-01-10

## 2020-01-10 NOTE — TELEPHONE ENCOUNTER
----- Message from Aline Spence sent at 1/10/2020 10:16 AM CST -----  Contact: MARK MCKEON JR. [[id number]]  Type: Patient Call Back    Who called:MARK MCKEON JR. [[id number]]    What is the request in detail: Patient is requesting a call back. He states that he would like to discuss the medication citalopram (CELEXA) 20 MG tablet that he was advised to take. He states that it is making him dizzy.   Please contact to further advise.    Can the clinic reply by [facility]? No    Best call back number: [phone]    Additional Information: N/A

## 2020-01-10 NOTE — TELEPHONE ENCOUNTER
Patient said that he became weak and dizzy shortly after taking his citalopram (CELEXA) 20 MG tab. 2 days in a row.  Patient then said he's stopped taking it and asked for rec. from Dr. Cortez.

## 2020-01-10 NOTE — TELEPHONE ENCOUNTER
----- Message from Marissa Villegas sent at 1/10/2020 10:11 AM CST -----  Contact: Self   Type: Patient Call Back    Who called: Self     What is the request in detail:  Patient is rt a call   Can the clinic reply by [facility]?  Call   Would the patient rather a call back or a response via [facility]?  Call   Best call back number: [phone]    Additional Information:

## 2020-01-10 NOTE — TELEPHONE ENCOUNTER
Below information given to patient, verbalized   understanding. Patient states he will call back for appointment but at this time he has too many appointments

## 2020-01-28 ENCOUNTER — OFFICE VISIT (OUTPATIENT)
Dept: FAMILY MEDICINE | Facility: CLINIC | Age: 85
End: 2020-01-28
Payer: MEDICARE

## 2020-01-28 VITALS
OXYGEN SATURATION: 99 % | WEIGHT: 107.38 LBS | DIASTOLIC BLOOD PRESSURE: 70 MMHG | SYSTOLIC BLOOD PRESSURE: 110 MMHG | HEART RATE: 81 BPM | TEMPERATURE: 98 F | HEIGHT: 65 IN | BODY MASS INDEX: 17.89 KG/M2

## 2020-01-28 DIAGNOSIS — Z86.73 HISTORY OF STROKE: ICD-10-CM

## 2020-01-28 DIAGNOSIS — I70.1 RAS (RENAL ARTERY STENOSIS): ICD-10-CM

## 2020-01-28 DIAGNOSIS — E78.5 HYPERLIPIDEMIA, UNSPECIFIED HYPERLIPIDEMIA TYPE: ICD-10-CM

## 2020-01-28 DIAGNOSIS — I77.9 BILATERAL CAROTID ARTERY DISEASE, UNSPECIFIED TYPE: ICD-10-CM

## 2020-01-28 DIAGNOSIS — I48.0 PAROXYSMAL ATRIAL FIBRILLATION: ICD-10-CM

## 2020-01-28 DIAGNOSIS — Z79.01 ANTICOAGULATED ON COUMADIN: ICD-10-CM

## 2020-01-28 DIAGNOSIS — Z00.00 ROUTINE MEDICAL EXAM: Primary | ICD-10-CM

## 2020-01-28 DIAGNOSIS — M06.9 RHEUMATOID ARTHRITIS, INVOLVING UNSPECIFIED SITE, UNSPECIFIED RHEUMATOID FACTOR PRESENCE: ICD-10-CM

## 2020-01-28 DIAGNOSIS — F13.20 SEDATIVE DEPENDENCE: ICD-10-CM

## 2020-01-28 DIAGNOSIS — N18.30 STAGE 3 CHRONIC KIDNEY DISEASE: ICD-10-CM

## 2020-01-28 DIAGNOSIS — D64.9 ANEMIA, UNSPECIFIED TYPE: ICD-10-CM

## 2020-01-28 DIAGNOSIS — F41.9 CHRONIC ANXIETY: ICD-10-CM

## 2020-01-28 DIAGNOSIS — G89.29 OTHER CHRONIC PAIN: ICD-10-CM

## 2020-01-28 DIAGNOSIS — I10 ESSENTIAL HYPERTENSION: ICD-10-CM

## 2020-01-28 DIAGNOSIS — F39 MOOD DISORDER: ICD-10-CM

## 2020-01-28 PROCEDURE — 99397 PER PM REEVAL EST PAT 65+ YR: CPT | Mod: 25,HCNC,S$GLB, | Performed by: INTERNAL MEDICINE

## 2020-01-28 PROCEDURE — 1101F PR PT FALLS ASSESS DOC 0-1 FALLS W/OUT INJ PAST YR: ICD-10-PCS | Mod: HCNC,CPTII,S$GLB, | Performed by: INTERNAL MEDICINE

## 2020-01-28 PROCEDURE — 1101F PT FALLS ASSESS-DOCD LE1/YR: CPT | Mod: HCNC,CPTII,S$GLB, | Performed by: INTERNAL MEDICINE

## 2020-01-28 PROCEDURE — 99397 PR PREVENTIVE VISIT,EST,65 & OVER: ICD-10-PCS | Mod: 25,HCNC,S$GLB, | Performed by: INTERNAL MEDICINE

## 2020-01-28 PROCEDURE — 99499 RISK ADDL DX/OHS AUDIT: ICD-10-PCS | Mod: S$GLB,,, | Performed by: INTERNAL MEDICINE

## 2020-01-28 PROCEDURE — 99214 OFFICE O/P EST MOD 30 MIN: CPT | Mod: 25,HCNC,S$GLB, | Performed by: INTERNAL MEDICINE

## 2020-01-28 PROCEDURE — 1126F PR PAIN SEVERITY QUANTIFIED, NO PAIN PRESENT: ICD-10-PCS | Mod: HCNC,S$GLB,, | Performed by: INTERNAL MEDICINE

## 2020-01-28 PROCEDURE — 1126F AMNT PAIN NOTED NONE PRSNT: CPT | Mod: HCNC,S$GLB,, | Performed by: INTERNAL MEDICINE

## 2020-01-28 PROCEDURE — 99214 PR OFFICE/OUTPT VISIT, EST, LEVL IV, 30-39 MIN: ICD-10-PCS | Mod: 25,HCNC,S$GLB, | Performed by: INTERNAL MEDICINE

## 2020-01-28 PROCEDURE — 1159F MED LIST DOCD IN RCRD: CPT | Mod: HCNC,S$GLB,, | Performed by: INTERNAL MEDICINE

## 2020-01-28 PROCEDURE — 1159F PR MEDICATION LIST DOCUMENTED IN MEDICAL RECORD: ICD-10-PCS | Mod: HCNC,S$GLB,, | Performed by: INTERNAL MEDICINE

## 2020-01-28 PROCEDURE — 99999 PR PBB SHADOW E&M-EST. PATIENT-LVL IV: ICD-10-PCS | Mod: PBBFAC,HCNC,, | Performed by: INTERNAL MEDICINE

## 2020-01-28 PROCEDURE — 99499 UNLISTED E&M SERVICE: CPT | Mod: S$GLB,,, | Performed by: INTERNAL MEDICINE

## 2020-01-28 PROCEDURE — 99999 PR PBB SHADOW E&M-EST. PATIENT-LVL IV: CPT | Mod: PBBFAC,HCNC,, | Performed by: INTERNAL MEDICINE

## 2020-01-28 NOTE — PROGRESS NOTES
CHIEF COMPLAINT:  Physical.                                                                                                                     HISTORY OF PRESENT ILLNESS:  An 89 year-old white male previously went to the gym but not doing so much    He simply just does not have an appetite.  Looks like it did lose about 15 lb over the last year but only about 4 lb in the last 5 months.  He lives next to his son who does bring in food.  He does not cook for himself.  Son says he sometimes eats very well.       He is a nonsmoker.  His chestx-ray in 2008 was normal.  Colonoscopy in 2006, normal with next in 10  Years and not likely clinically indicated based upon his age.      He has had PSAs in the past but also not likely indicated to continue to follow the previously normal PSA based upon his age  .                                                                                          REVIEW OF SYSTEMS:  Weight  stable currently   .  No fevers or chills.  No    vision or ENT symptoms.  No dysphagia.  No chest pain, shortness of breath.   No GI or  problems that are new.  No new myalgias or arthralgias.  No     new skin rashes.  All other complete review of systems negative.                                                                                          PAST MEDICAL HISTORY:                                                        1. Hypertension.                                                             2. Hyperlipidemia.                                                           3. Severe knee DJD.                                                          4. Questionable rheumatoid arthritis, the patient presented to our clinic on prednisone 5 mg, started by a rheumatologist Dr. Dao. The patient  denied ever having any hand, DJD or arthritis problems.                      5. Remote history of peptic ulcer disease requiring surgery.                 6. Lumbar disk surgery by Dr. Mckay.                                       7. Tinea corporis.                                                           8. Nonspecific stress echo at [facility] 2000.                                  9. Cardiac angiogram apparently at [facility] with chest pain that was  unremarkable, no report.                                                     10. Chronic anxiety and occasional panic attacks.                            11. ED.                                                                      12. Gout.                                                                    13. Prostatitis/ BPH -Dr Ranjit Reyna                                                         14. Neck pain with numbness in both arms, assessed by Dr. Mackey  15.  Atrial fibrillation.    Sandra                                                 16.  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                                                     17.  Severe carotid disease, 75% right internal carotid, 50% left internal  carotid and 70% left external carotid.     18.   Colonoscopy in 2006, normal                                                                                                                                                                                                                                                        SOCIAL HISTORY: Does not smoke or drink.                                                                                                                  FAMILY HISTORY: Unremarkable.                                   PHYSICAL EXAMINATION:                                                        VITAL SIGNS:   as above    GENERAL:  Pleasant thin male.                                                HEENT:  Conjunctivae clear.  Pupils equal and reactive.  Nose and mouth      clear and moist.  Teeth edentulous.  Impaction on right removed with pick by MD himself                                        NECK:  Supple.  Thyroid nonpalpable.                                          RESPIRATORY:  Effort is good.                                                LUNGS:  All clear.                                                           HEART:  Regular rate and rhythm without murmurs, gallops or rubs.  No        carotid bruits.  No edema.                                                   ABDOMEN:  Soft, nondistended, nontender.  No hepatosplenomegaly.             SKIN:  No rashes.  Warm to touch.                                            EXTREMITIES:  Gait normal.  Digits without clubbing or cyanosis.                                                                                          Labs and x-ray reports reviewed as was colonoscopy.                                                                                                         Mariano was seen today for cerumen impaction.    Diagnoses and all orders for this visit:    Routine medical exam, we'll update lab work.  Pursuing further colonoscopy and PSA are not appropriate upon his age.  He will continue to be active.                                             Additional evaluation and management issues:    Additionally, patient has numerous other total issues to address today.  Reviewed outside records.  Reviewed that he had his hydrocele removed and then when restarting Coumadin he had a large hematoma apparently and had to stay in the hospital with a drain.  We reviewed his prior labs and he has had a history of mild hyperkalemia and CKD which was done recently at  and stable.  He has rheumatoid arthritis but his clinical disease appears stable.  He has hyperlipidemia which has been assessed in the past and has been normal so we will defer and he has had it checked recently by his cardiologist.      He sees the heart clinic for his atrial fibrillation and appears they had his Coumadin change to an alternate anticoagulant due to the recent hematoma issues I presume.     He does have a history of stroke and blood pressures  under good control and he has had no interval neurological deficits.  He has had a slight hemoglobin reduction which  Is better.  His essential hypertension has been under good control.  His weight is been stable but we will check a TSH.     Patient also continues to have trouble sleeping.  He awakes sometime between 2-4 a.m. and can't get back to sleep.  He was told by Cardiology to stop probably his Remeron and his sedative because he was complaining about feeling weak and dizzy in the morning and symptoms did improve.  We discussed it could well have been either medication.  Due to his reduced appetite and so forth I would like him to restart the Remeron and assess if that does not leave him too sleepy the next day.  He also apparently stopped on his own his Celexa.  His son does feel he is somewhat depressed.  I would like him to restart the Celexa and written instructions about these medication changes were given      Librium at night for sleep and he has done so chronically without side effects and he was on this particular pattern of medications prior to being under my care.       Continues to follow up with what sounds like an outside vascular who recently checked his carotids and no intervention was planned.  No TIA symptoms.     .  All the separate issues reviewed and patient counseled an evaluation and management will be based upon time counseling.Total time over 25 minutes with over 50% counseling.    Continues to follow up with Dr. Madsen in Ophthalmology for his macular degeneration.  He did get some injections in the past but not currently      Assessment and plan:            Essential hypertension, chronic and stable  -     Cancel: Comprehensive metabolic panel; Future  -     Cancel: CBC auto differential; Future  -     Cancel: TSH; Future  -     Cancel: Lipid panel; Future    Chronic anxiety, restart Celexa and Remeron    Other chronic pain, appears to be stable, may not be using the hydrocodone  right now.    Sedative dependence, currently off the Librium, may need to restart if insomnia really becomes a problem since he has tolerated it for so long    Hyperlipidemia, unspecified hyperlipidemia type, recently recheck by cardiology  -     Cancel: Lipid panel; Future    Mood disorder restart Celexa    Paroxysmal atrial fibrillation, follows with Cardiology    History of stroke    Rheumatoid arthritis, involving unspecified site, unspecified rheumatoid factor presence    TO (renal artery stenosis)    Bilateral carotid artery disease, unspecified type    Anticoagulated on Coumadin, now on alternative    Anemia, unspecified type, recently had a drop in blood count likely due to the hematoma but that is improving on its own  -     Cancel: CBC auto differential; Future    Stage 3 chronic kidney disease  -     Cancel: Comprehensive metabolic panel; Future

## 2020-01-29 NOTE — TELEPHONE ENCOUNTER
Okay, get some clarification.  The mirtazapine or Remeron 45 mg is to be taken at night.  The message appears to say that he might have taken it during the day.    He really does need to retry taking the mirtazapine at night and he can possibly break it in half but it is difficult to  if the medications will worker not work just based upon one dose.    Please ask him to retry the half of the Remeron/mirtazapine at night

## 2020-01-29 NOTE — TELEPHONE ENCOUNTER
----- Message from Annabella Hernandez sent at 1/29/2020  1:09 PM CST -----  Contact: pt  Type: Patient Call Back    Who called:pt    What is the request in detail:pt has questions in regards to medications and dizziness. Call pt    Can the clinic reply by [facility]?    Would the patient rather a call back or a response via [facility]? call    Best call back number:[phone]    Additional Information:

## 2020-01-29 NOTE — TELEPHONE ENCOUNTER
Patient said that Dr. Alba put him back on [id number] mg which he took last night before going to bed to help him sleep but he was awake for 2 hrs before falling asleep and when he awakened this morning, he was extremely shaky.  He also said the he was told to resume taking mirtazapine 45 mg for his nerves but when he took one today, it made him more shaky and nervous just like it was doing when he stopped taking in the past.  Patient said that he can't take either of these meds. again but he's still requesting something to help him sleep.  Please advise.

## 2020-02-03 ENCOUNTER — TELEPHONE (OUTPATIENT)
Dept: FAMILY MEDICINE | Facility: CLINIC | Age: 85
End: 2020-02-03

## 2020-02-03 NOTE — TELEPHONE ENCOUNTER
Below info given to patient and son, verbalized understanding. Patient confirmed is taking remeron at night

## 2020-02-03 NOTE — TELEPHONE ENCOUNTER
----- Message from Anisha Rachel sent at 2/3/2020 11:45 AM CST -----  Contact: MARK MCKEON JR  Name of Who is Calling: MARK MCKEON JR      What is the request in detail: Would like to speak to staff in regards to citalopram (CELEXA) 20 MG tablet. States he can't take it anymore because it makes him dizzy to where he can barely walk. Please advise.       Can the clinic reply by [facility]: No      What Number to Call Back [facility] not [facility]: [phone]

## 2020-02-03 NOTE — TELEPHONE ENCOUNTER
Thanks, clarify with patient and the son.    It is okay for them to stop the Celexa completely    He should have an additional medication that he was going to restart, the mirtazapine/Remeron which I do recommend they continue since it can help with depression and mood as well as appetite and sleep    Confirm if indeed he is still taking the mirtazapine 45 mg at night

## 2020-02-06 ENCOUNTER — TELEPHONE (OUTPATIENT)
Dept: FAMILY MEDICINE | Facility: CLINIC | Age: 85
End: 2020-02-06

## 2020-02-06 NOTE — TELEPHONE ENCOUNTER
Pt is requesting medication for anxiety and depression. Pts states he has no ambition to do anything during the day. Pt previous took citalopram but dc'd due experiencing side effects. Please advise.

## 2020-02-06 NOTE — TELEPHONE ENCOUNTER
----- Message from Lo Osborne sent at 2/6/2020  2:22 PM CST -----  Type: Patient Call Back    Who called: pt     What is the request in detail: pt requesting to speak to nurse regarding medication. He is asking for a prescription for anxiety and depression.     Can the clinic reply by [facility]? No     Would the patient rather a call back or a response via [facility]? Call back     Best call back number: [phone]    Additional Information: ..  [facility]/pharmacy #[id number] - [place], [place]  [facility] [address]  Phone: [phone] Fax: [phone]

## 2020-02-07 RX ORDER — SERTRALINE HYDROCHLORIDE 25 MG/1
25 TABLET, FILM COATED ORAL DAILY
Qty: 30 TABLET | Refills: 11 | Status: SHIPPED | OUTPATIENT
Start: 2020-02-07 | End: 2020-04-02 | Stop reason: SDUPTHER

## 2020-02-07 NOTE — TELEPHONE ENCOUNTER
Please call, state to the family    Dr. nelson had to research other medications he might have tried in the past and we will try something new.    He does need to know that any medication we give, the brain takes about four weeks to adjust and catch up.      He should still be taking the mirtazapine 45 mg at night which is also anti depression medicine.  That medication probably also has not had enough time to help.    We will add an additional medication, low-dose Zoloft 25 mg to take during the day or at night, whichever he feels is better

## 2020-02-14 DIAGNOSIS — I10 ESSENTIAL HYPERTENSION: ICD-10-CM

## 2020-03-02 DIAGNOSIS — F39 MOOD DISORDER: Primary | ICD-10-CM

## 2020-03-02 DIAGNOSIS — G89.29 OTHER CHRONIC PAIN: ICD-10-CM

## 2020-03-02 NOTE — TELEPHONE ENCOUNTER
----- Message from Rosa Melo sent at 3/2/2020  2:22 PM CST -----  Contact: Self  Type: RX Refill Request    Who Called:  Self    Have you contacted your pharmacy: no     Refill or New Rx: refill     RX Name and Strength: chlordiazepoxide (LIBRIUM) 10 MG capsule                                                    HYDROcodone-acetaminophen (NORCO) 5-325 mg per tablet    Preferred Pharmacy with phone number: [facility]/pharmacy #[id number] [address] [phone] (Phone)  [phone] (Fax)        Local or Mail Order: Local     Ordering Provider: Ehrensing    Would the patient rather a call back or a response via [facility]? Call   Best Call Back Number: [phone]    Additional Information: Patient would like pain meds to be a little stronger

## 2020-03-04 RX ORDER — HYDROCODONE BITARTRATE AND ACETAMINOPHEN 5; 325 MG/1; MG/1
1 TABLET ORAL EVERY 6 HOURS PRN
Qty: 120 TABLET | Refills: 0 | Status: SHIPPED | OUTPATIENT
Start: 2020-03-04 | End: 2020-05-29 | Stop reason: SDUPTHER

## 2020-03-04 RX ORDER — CHLORDIAZEPOXIDE HYDROCHLORIDE 10 MG/1
CAPSULE, GELATIN COATED ORAL
Qty: 60 CAPSULE | Refills: 5 | Status: SHIPPED | OUTPATIENT
Start: 2020-03-04 | End: 2020-09-17

## 2020-03-16 ENCOUNTER — TELEPHONE (OUTPATIENT)
Dept: FAMILY MEDICINE | Facility: CLINIC | Age: 85
End: 2020-03-16

## 2020-03-16 NOTE — TELEPHONE ENCOUNTER
Patient said that he remembered that he cx. his upcoming appt. earlier today because he doesn't need any med. refills at this time.   Patient said that he will call back to resched. at a later date.

## 2020-03-16 NOTE — TELEPHONE ENCOUNTER
----- Message from Rosa Melo sent at 3/16/2020  2:49 PM CDT -----  Contact: Self  Type: Patient Call Back    Who called: self  What is the request in detail: patient would like to know if his medication can be refilled and cancel the appointment. He would like to avoid coming out the house.    Can the clinic reply by [facility]? no    Would the patient rather a call back or a response via [facility]? call    Best call back number: [phone]

## 2020-03-16 NOTE — TELEPHONE ENCOUNTER
----- Message from Annabella David sent at 3/16/2020  4:14 PM CDT -----  Contact: pt  Type: Patient Call Back    Who called:pt    What is the request in detail:pt has questions in regards to health. Call pt    Can the clinic reply by [facility]?    Would the patient rather a call back or a response via [facility]? call    Best call back number:[phone]    Additional Information:

## 2020-04-02 ENCOUNTER — TELEPHONE (OUTPATIENT)
Dept: FAMILY MEDICINE | Facility: CLINIC | Age: 85
End: 2020-04-02

## 2020-04-02 RX ORDER — METOPROLOL TARTRATE 25 MG/1
12.5 TABLET, FILM COATED ORAL 2 TIMES DAILY
Qty: 90 TABLET | Refills: 3 | Status: CANCELLED | OUTPATIENT
Start: 2020-04-02

## 2020-04-03 RX ORDER — SERTRALINE HYDROCHLORIDE 25 MG/1
25 TABLET, FILM COATED ORAL DAILY
Qty: 30 TABLET | Refills: 11 | Status: SHIPPED | OUTPATIENT
Start: 2020-04-03 | End: 2021-02-26

## 2020-05-11 ENCOUNTER — PES CALL (OUTPATIENT)
Dept: ADMINISTRATIVE | Facility: CLINIC | Age: 85
End: 2020-05-11

## 2020-05-29 DIAGNOSIS — G89.29 OTHER CHRONIC PAIN: ICD-10-CM

## 2020-05-29 NOTE — TELEPHONE ENCOUNTER
Spoke with patient about medication refill. States he only has a couple left and would greatly appreciate a refill. Please advise.

## 2020-05-29 NOTE — TELEPHONE ENCOUNTER
----- Message from Aline Spence sent at 5/29/2020  9:59 AM CDT -----  Contact: MARK MCKEON JR. [[id number]]  Type: RX Refill Request    Who Called: MARK MCKEON JR. [[id number]]    RX Name and Strength: HYDROcodone-acetaminophen (NORCO) 5-325 mg per tablet    Preferred Pharmacy with phone number: [facility]/PHARMACY #[id number] [place], [place] - [address] [facility]    Best Call Back Number: [phone]    Additional Information: N/A

## 2020-05-29 NOTE — TELEPHONE ENCOUNTER
----- Message from Lo Osborne sent at 5/29/2020  2:08 PM CDT -----  Type: Patient Call Back    Who called: pt     What is the request in detail: pt calling to check on the status of pain meds requesting. She states he only has 2 pill left and take 4 a day.     Can the clinic reply by [facility]? No     Would the patient rather a call back or a response via [facility]? Call back     Best call back number: [phone]    Additional Information:

## 2020-05-30 RX ORDER — HYDROCODONE BITARTRATE AND ACETAMINOPHEN 5; 325 MG/1; MG/1
1 TABLET ORAL EVERY 6 HOURS PRN
Qty: 120 TABLET | Refills: 0 | Status: SHIPPED | OUTPATIENT
Start: 2020-05-30 | End: 2020-08-17 | Stop reason: SDUPTHER

## 2020-08-17 DIAGNOSIS — G89.29 OTHER CHRONIC PAIN: ICD-10-CM

## 2020-08-17 NOTE — TELEPHONE ENCOUNTER
----- Message from Julieth Moran sent at 8/17/2020  2:55 PM CDT -----  Can the clinic reply in [facility]:     Please refill the medication(s) listed below. The patient can be reached at this phone number once it is called into the pharmacy.   [phone]    Medication #1  HYDROcodone-acetaminophen (NORCO) 5-325 mg per tablet    Medication #2    Preferred Pharmacy: [facility]/PHARMACY #[id number] [address]    Patient request call to notify when sent  to pharmacy

## 2020-08-17 NOTE — TELEPHONE ENCOUNTER
----- Message from Shae Zepeda sent at 8/17/2020 10:50 AM CDT -----  Regarding: Med refill  Contact: Mariano  Type: RX Refill Request    Who Called: Mariano    Have you contacted your pharmacy:No    Refill or New Rx:Refill    RX Name and Strength:HYDROcodone-acetaminophen (NORCO) 5-325 mg per tablet 120 tablet     Preferred Pharmacy with phone number:[facility]/pharmacy #[id number] [address] [phone] (Phone)  [phone] (Fax    Local or Mail Order:Local    Ordering Provider:Ehrensing    Would the patient rather a call back or a response via [facility]? Call    Best Call Back Number:[phone]    Additional Information: Medication refill

## 2020-08-18 RX ORDER — HYDROCODONE BITARTRATE AND ACETAMINOPHEN 5; 325 MG/1; MG/1
1 TABLET ORAL EVERY 6 HOURS PRN
Qty: 120 TABLET | Refills: 0 | Status: SHIPPED | OUTPATIENT
Start: 2020-08-18 | End: 2020-10-19 | Stop reason: SDUPTHER

## 2020-09-16 DIAGNOSIS — F39 MOOD DISORDER: ICD-10-CM

## 2020-09-17 RX ORDER — CHLORDIAZEPOXIDE HYDROCHLORIDE 10 MG/1
CAPSULE, GELATIN COATED ORAL
Qty: 60 CAPSULE | Refills: 5 | Status: SHIPPED | OUTPATIENT
Start: 2020-09-17

## 2020-09-17 NOTE — TELEPHONE ENCOUNTER
----- Message from Amanda Lr sent at 9/17/2020  3:14 PM CDT -----  Regarding: Refill Request  Please refill the medication(s) listed below :    Medication # 1 :chlordiazepoxide (LIBRIUM) 10 MG capsule    Please call the patient when the prescription is sent to pharmacy :[phone]     Can the clinic reply in [facility] :No    Preferred Pharmacy : [facility]/pharmacy #[id number] - [place] - [address] [place] [address] [address] [phone] (Phone)  [phone] (Fax)

## 2020-10-19 DIAGNOSIS — G89.29 OTHER CHRONIC PAIN: ICD-10-CM

## 2020-10-19 RX ORDER — HYDROCODONE BITARTRATE AND ACETAMINOPHEN 5; 325 MG/1; MG/1
1 TABLET ORAL EVERY 6 HOURS PRN
Qty: 80 TABLET | Refills: 0 | Status: SHIPPED | OUTPATIENT
Start: 2020-10-19 | End: 2020-12-03 | Stop reason: SDUPTHER

## 2020-12-03 DIAGNOSIS — G89.29 OTHER CHRONIC PAIN: ICD-10-CM

## 2020-12-03 RX ORDER — HYDROCODONE BITARTRATE AND ACETAMINOPHEN 5; 325 MG/1; MG/1
1 TABLET ORAL EVERY 6 HOURS PRN
Qty: 80 TABLET | Refills: 0 | Status: SHIPPED | OUTPATIENT
Start: 2020-12-03 | End: 2021-02-09 | Stop reason: SDUPTHER

## 2021-01-26 ENCOUNTER — TELEPHONE (OUTPATIENT)
Dept: FAMILY MEDICINE | Facility: CLINIC | Age: 86
End: 2021-01-26

## 2021-02-08 DIAGNOSIS — G89.29 OTHER CHRONIC PAIN: ICD-10-CM

## 2021-02-10 RX ORDER — HYDROCODONE BITARTRATE AND ACETAMINOPHEN 5; 325 MG/1; MG/1
1 TABLET ORAL EVERY 6 HOURS PRN
Qty: 120 TABLET | Refills: 0 | Status: SHIPPED | OUTPATIENT
Start: 2021-02-10 | End: 2021-05-18 | Stop reason: SDUPTHER

## 2021-02-26 RX ORDER — SERTRALINE HYDROCHLORIDE 25 MG/1
25 TABLET, FILM COATED ORAL DAILY
Qty: 90 TABLET | Refills: 12 | Status: SHIPPED | OUTPATIENT
Start: 2021-02-26 | End: 2022-04-20

## 2021-03-01 ENCOUNTER — TELEPHONE (OUTPATIENT)
Dept: FAMILY MEDICINE | Facility: CLINIC | Age: 86
End: 2021-03-01

## 2021-03-03 ENCOUNTER — CLINICAL SUPPORT (OUTPATIENT)
Dept: URGENT CARE | Facility: CLINIC | Age: 86
End: 2021-03-03
Payer: MEDICARE

## 2021-03-03 DIAGNOSIS — Z11.9 ENCOUNTER FOR SCREENING EXAMINATION FOR INFECTIOUS DISEASE: Primary | ICD-10-CM

## 2021-03-03 LAB
CTP QC/QA: YES
SARS-COV-2 RDRP RESP QL NAA+PROBE: POSITIVE

## 2021-03-03 PROCEDURE — U0002 COVID-19 LAB TEST NON-CDC: HCPCS | Mod: QW,S$GLB,, | Performed by: INTERNAL MEDICINE

## 2021-03-03 PROCEDURE — U0002: ICD-10-PCS | Mod: QW,S$GLB,, | Performed by: INTERNAL MEDICINE

## 2021-03-04 DIAGNOSIS — U07.1 COVID-19 VIRUS DETECTED: ICD-10-CM

## 2021-03-15 ENCOUNTER — TELEPHONE (OUTPATIENT)
Dept: FAMILY MEDICINE | Facility: CLINIC | Age: 86
End: 2021-03-15

## 2021-03-15 RX ORDER — AZITHROMYCIN 250 MG/1
TABLET, FILM COATED ORAL
Qty: 6 TABLET | Refills: 0 | Status: SHIPPED | OUTPATIENT
Start: 2021-03-15

## 2021-03-26 ENCOUNTER — NURSE TRIAGE (OUTPATIENT)
Dept: ADMINISTRATIVE | Facility: CLINIC | Age: 86
End: 2021-03-26

## 2021-03-26 ENCOUNTER — TELEPHONE (OUTPATIENT)
Dept: FAMILY MEDICINE | Facility: CLINIC | Age: 86
End: 2021-03-26

## 2021-04-27 RX ORDER — METOPROLOL TARTRATE 25 MG/1
12.5 TABLET, FILM COATED ORAL 2 TIMES DAILY
Qty: 90 TABLET | Refills: 3 | Status: SHIPPED | OUTPATIENT
Start: 2021-04-27 | End: 2021-04-29 | Stop reason: SDUPTHER

## 2021-04-29 DIAGNOSIS — I10 ESSENTIAL HYPERTENSION: Primary | ICD-10-CM

## 2021-04-29 RX ORDER — METOPROLOL TARTRATE 25 MG/1
12.5 TABLET, FILM COATED ORAL 2 TIMES DAILY
Qty: 90 TABLET | Refills: 3 | Status: SHIPPED | OUTPATIENT
Start: 2021-04-29 | End: 2022-02-09

## 2021-05-10 ENCOUNTER — TELEPHONE (OUTPATIENT)
Dept: FAMILY MEDICINE | Facility: CLINIC | Age: 86
End: 2021-05-10

## 2021-05-18 DIAGNOSIS — G89.29 OTHER CHRONIC PAIN: ICD-10-CM

## 2021-05-18 RX ORDER — HYDROCODONE BITARTRATE AND ACETAMINOPHEN 5; 325 MG/1; MG/1
1 TABLET ORAL EVERY 6 HOURS PRN
Qty: 60 TABLET | Refills: 0 | Status: SHIPPED | OUTPATIENT
Start: 2021-05-18 | End: 2021-06-21 | Stop reason: SDUPTHER

## 2021-05-25 RX ORDER — MIRTAZAPINE 45 MG/1
TABLET, FILM COATED ORAL
Qty: 90 TABLET | Refills: 12 | Status: SHIPPED | OUTPATIENT
Start: 2021-05-25 | End: 2022-06-07

## 2021-06-21 DIAGNOSIS — G89.29 OTHER CHRONIC PAIN: ICD-10-CM

## 2021-06-24 ENCOUNTER — TELEPHONE (OUTPATIENT)
Dept: FAMILY MEDICINE | Facility: CLINIC | Age: 86
End: 2021-06-24

## 2021-06-24 DIAGNOSIS — G89.29 OTHER CHRONIC PAIN: ICD-10-CM

## 2021-06-24 RX ORDER — HYDROCODONE BITARTRATE AND ACETAMINOPHEN 5; 325 MG/1; MG/1
1 TABLET ORAL EVERY 6 HOURS PRN
Qty: 60 TABLET | Refills: 0 | Status: SHIPPED | OUTPATIENT
Start: 2021-07-24 | End: 2021-07-09 | Stop reason: SDUPTHER

## 2021-06-24 RX ORDER — HYDROCODONE BITARTRATE AND ACETAMINOPHEN 5; 325 MG/1; MG/1
1 TABLET ORAL EVERY 6 HOURS PRN
Qty: 60 TABLET | Refills: 0 | Status: SHIPPED | OUTPATIENT
Start: 2021-06-24 | End: 2021-07-09 | Stop reason: SDUPTHER

## 2021-06-24 RX ORDER — HYDROCODONE BITARTRATE AND ACETAMINOPHEN 5; 325 MG/1; MG/1
1 TABLET ORAL EVERY 6 HOURS PRN
Qty: 60 TABLET | Refills: 0 | OUTPATIENT
Start: 2021-06-24

## 2021-07-09 ENCOUNTER — OFFICE VISIT (OUTPATIENT)
Dept: FAMILY MEDICINE | Facility: CLINIC | Age: 86
End: 2021-07-09
Payer: MEDICARE

## 2021-07-09 ENCOUNTER — LAB VISIT (OUTPATIENT)
Dept: LAB | Facility: HOSPITAL | Age: 86
End: 2021-07-09
Attending: INTERNAL MEDICINE
Payer: MEDICARE

## 2021-07-09 VITALS
HEART RATE: 53 BPM | HEIGHT: 65 IN | BODY MASS INDEX: 18.55 KG/M2 | DIASTOLIC BLOOD PRESSURE: 58 MMHG | OXYGEN SATURATION: 99 % | WEIGHT: 111.31 LBS | SYSTOLIC BLOOD PRESSURE: 124 MMHG | TEMPERATURE: 99 F

## 2021-07-09 DIAGNOSIS — G89.29 OTHER CHRONIC PAIN: ICD-10-CM

## 2021-07-09 DIAGNOSIS — F11.20 OPIATE DEPENDENCE, CONTINUOUS: ICD-10-CM

## 2021-07-09 DIAGNOSIS — Z79.01 ANTICOAGULATED ON COUMADIN: ICD-10-CM

## 2021-07-09 DIAGNOSIS — I48.0 PAROXYSMAL ATRIAL FIBRILLATION: ICD-10-CM

## 2021-07-09 DIAGNOSIS — D64.9 ANEMIA, UNSPECIFIED TYPE: ICD-10-CM

## 2021-07-09 DIAGNOSIS — N18.30 STAGE 3 CHRONIC KIDNEY DISEASE, UNSPECIFIED WHETHER STAGE 3A OR 3B CKD: ICD-10-CM

## 2021-07-09 DIAGNOSIS — Z86.73 HISTORY OF STROKE: ICD-10-CM

## 2021-07-09 DIAGNOSIS — I10 ESSENTIAL HYPERTENSION: ICD-10-CM

## 2021-07-09 DIAGNOSIS — Z00.00 ROUTINE MEDICAL EXAM: ICD-10-CM

## 2021-07-09 DIAGNOSIS — E78.5 HYPERLIPIDEMIA, UNSPECIFIED HYPERLIPIDEMIA TYPE: ICD-10-CM

## 2021-07-09 DIAGNOSIS — I70.1 RAS (RENAL ARTERY STENOSIS): ICD-10-CM

## 2021-07-09 DIAGNOSIS — F39 MOOD DISORDER: ICD-10-CM

## 2021-07-09 DIAGNOSIS — Z00.00 ROUTINE MEDICAL EXAM: Primary | ICD-10-CM

## 2021-07-09 DIAGNOSIS — I77.9 BILATERAL CAROTID ARTERY DISEASE, UNSPECIFIED TYPE: ICD-10-CM

## 2021-07-09 DIAGNOSIS — M06.9 RHEUMATOID ARTHRITIS, INVOLVING UNSPECIFIED SITE, UNSPECIFIED WHETHER RHEUMATOID FACTOR PRESENT: ICD-10-CM

## 2021-07-09 DIAGNOSIS — F41.9 CHRONIC ANXIETY: ICD-10-CM

## 2021-07-09 DIAGNOSIS — F13.20 SEDATIVE DEPENDENCE: ICD-10-CM

## 2021-07-09 LAB
ALBUMIN SERPL BCP-MCNC: 4.1 G/DL (ref 3.5–5.2)
ALP SERPL-CCNC: 94 U/L (ref 55–135)
ALT SERPL W/O P-5'-P-CCNC: 23 U/L (ref 10–44)
ANION GAP SERPL CALC-SCNC: 9 MMOL/L (ref 8–16)
AST SERPL-CCNC: 21 U/L (ref 10–40)
BASOPHILS # BLD AUTO: 0.08 K/UL (ref 0–0.2)
BASOPHILS NFR BLD: 0.8 % (ref 0–1.9)
BILIRUB SERPL-MCNC: 0.4 MG/DL (ref 0.1–1)
BUN SERPL-MCNC: 36 MG/DL (ref 8–23)
CALCIUM SERPL-MCNC: 9.6 MG/DL (ref 8.7–10.5)
CHLORIDE SERPL-SCNC: 106 MMOL/L (ref 95–110)
CO2 SERPL-SCNC: 23 MMOL/L (ref 23–29)
CREAT SERPL-MCNC: 1.5 MG/DL (ref 0.5–1.4)
DIFFERENTIAL METHOD: ABNORMAL
EOSINOPHIL # BLD AUTO: 0.2 K/UL (ref 0–0.5)
EOSINOPHIL NFR BLD: 1.6 % (ref 0–8)
ERYTHROCYTE [DISTWIDTH] IN BLOOD BY AUTOMATED COUNT: 12.7 % (ref 11.5–14.5)
EST. GFR  (AFRICAN AMERICAN): 46.7 ML/MIN/1.73 M^2
EST. GFR  (NON AFRICAN AMERICAN): 40.4 ML/MIN/1.73 M^2
GLUCOSE SERPL-MCNC: 87 MG/DL (ref 70–110)
HCT VFR BLD AUTO: 39.3 % (ref 40–54)
HGB BLD-MCNC: 12.2 G/DL (ref 14–18)
IMM GRANULOCYTES # BLD AUTO: 0.05 K/UL (ref 0–0.04)
IMM GRANULOCYTES NFR BLD AUTO: 0.5 % (ref 0–0.5)
LYMPHOCYTES # BLD AUTO: 1.6 K/UL (ref 1–4.8)
LYMPHOCYTES NFR BLD: 16.4 % (ref 18–48)
MCH RBC QN AUTO: 30.3 PG (ref 27–31)
MCHC RBC AUTO-ENTMCNC: 31 G/DL (ref 32–36)
MCV RBC AUTO: 98 FL (ref 82–98)
MONOCYTES # BLD AUTO: 0.9 K/UL (ref 0.3–1)
MONOCYTES NFR BLD: 8.6 % (ref 4–15)
NEUTROPHILS # BLD AUTO: 7.2 K/UL (ref 1.8–7.7)
NEUTROPHILS NFR BLD: 72.1 % (ref 38–73)
NRBC BLD-RTO: 0 /100 WBC
PLATELET # BLD AUTO: 283 K/UL (ref 150–450)
PMV BLD AUTO: 10 FL (ref 9.2–12.9)
POTASSIUM SERPL-SCNC: 4.9 MMOL/L (ref 3.5–5.1)
PROT SERPL-MCNC: 7.8 G/DL (ref 6–8.4)
RBC # BLD AUTO: 4.03 M/UL (ref 4.6–6.2)
SODIUM SERPL-SCNC: 138 MMOL/L (ref 136–145)
TSH SERPL DL<=0.005 MIU/L-ACNC: 2.01 UIU/ML (ref 0.4–4)
WBC # BLD AUTO: 9.97 K/UL (ref 3.9–12.7)

## 2021-07-09 PROCEDURE — 86334 PATHOLOGIST INTERPRETATION IFE: ICD-10-PCS | Mod: 26,,, | Performed by: PATHOLOGY

## 2021-07-09 PROCEDURE — 84443 ASSAY THYROID STIM HORMONE: CPT | Performed by: INTERNAL MEDICINE

## 2021-07-09 PROCEDURE — 3288F FALL RISK ASSESSMENT DOCD: CPT | Mod: CPTII,S$GLB,, | Performed by: INTERNAL MEDICINE

## 2021-07-09 PROCEDURE — 99999 PR PBB SHADOW E&M-EST. PATIENT-LVL III: CPT | Mod: PBBFAC,,, | Performed by: INTERNAL MEDICINE

## 2021-07-09 PROCEDURE — 99999 PR PBB SHADOW E&M-EST. PATIENT-LVL III: ICD-10-PCS | Mod: PBBFAC,,, | Performed by: INTERNAL MEDICINE

## 2021-07-09 PROCEDURE — 86334 IMMUNOFIX E-PHORESIS SERUM: CPT | Performed by: INTERNAL MEDICINE

## 2021-07-09 PROCEDURE — 99397 PR PREVENTIVE VISIT,EST,65 & OVER: ICD-10-PCS | Mod: S$GLB,,, | Performed by: INTERNAL MEDICINE

## 2021-07-09 PROCEDURE — 3288F PR FALLS RISK ASSESSMENT DOCUMENTED: ICD-10-PCS | Mod: CPTII,S$GLB,, | Performed by: INTERNAL MEDICINE

## 2021-07-09 PROCEDURE — 99214 PR OFFICE/OUTPT VISIT, EST, LEVL IV, 30-39 MIN: ICD-10-PCS | Mod: 25,S$GLB,, | Performed by: INTERNAL MEDICINE

## 2021-07-09 PROCEDURE — 85025 COMPLETE CBC W/AUTO DIFF WBC: CPT | Performed by: INTERNAL MEDICINE

## 2021-07-09 PROCEDURE — 86334 IMMUNOFIX E-PHORESIS SERUM: CPT | Mod: 26,,, | Performed by: PATHOLOGY

## 2021-07-09 PROCEDURE — 99499 RISK ADDL DX/OHS AUDIT: ICD-10-PCS | Mod: S$GLB,,, | Performed by: INTERNAL MEDICINE

## 2021-07-09 PROCEDURE — 1101F PT FALLS ASSESS-DOCD LE1/YR: CPT | Mod: CPTII,S$GLB,, | Performed by: INTERNAL MEDICINE

## 2021-07-09 PROCEDURE — 99397 PER PM REEVAL EST PAT 65+ YR: CPT | Mod: S$GLB,,, | Performed by: INTERNAL MEDICINE

## 2021-07-09 PROCEDURE — 80053 COMPREHEN METABOLIC PANEL: CPT | Performed by: INTERNAL MEDICINE

## 2021-07-09 PROCEDURE — 1159F PR MEDICATION LIST DOCUMENTED IN MEDICAL RECORD: ICD-10-PCS | Mod: S$GLB,,, | Performed by: INTERNAL MEDICINE

## 2021-07-09 PROCEDURE — 1101F PR PT FALLS ASSESS DOC 0-1 FALLS W/OUT INJ PAST YR: ICD-10-PCS | Mod: CPTII,S$GLB,, | Performed by: INTERNAL MEDICINE

## 2021-07-09 PROCEDURE — 84165 PROTEIN E-PHORESIS SERUM: CPT | Mod: 26,,, | Performed by: PATHOLOGY

## 2021-07-09 PROCEDURE — 99214 OFFICE O/P EST MOD 30 MIN: CPT | Mod: 25,S$GLB,, | Performed by: INTERNAL MEDICINE

## 2021-07-09 PROCEDURE — 84165 PROTEIN E-PHORESIS SERUM: CPT | Performed by: INTERNAL MEDICINE

## 2021-07-09 PROCEDURE — 99499 UNLISTED E&M SERVICE: CPT | Mod: S$GLB,,, | Performed by: INTERNAL MEDICINE

## 2021-07-09 PROCEDURE — 1159F MED LIST DOCD IN RCRD: CPT | Mod: S$GLB,,, | Performed by: INTERNAL MEDICINE

## 2021-07-09 PROCEDURE — 84165 PATHOLOGIST INTERPRETATION SPE: ICD-10-PCS | Mod: 26,,, | Performed by: PATHOLOGY

## 2021-07-09 PROCEDURE — 36415 COLL VENOUS BLD VENIPUNCTURE: CPT | Mod: PO | Performed by: INTERNAL MEDICINE

## 2021-07-09 RX ORDER — HYDROCODONE BITARTRATE AND ACETAMINOPHEN 5; 325 MG/1; MG/1
1 TABLET ORAL EVERY 6 HOURS PRN
Qty: 60 TABLET | Refills: 0 | Status: SHIPPED | OUTPATIENT
Start: 2021-08-09 | End: 2021-09-04 | Stop reason: SDUPTHER

## 2021-07-09 RX ORDER — HYDROCODONE BITARTRATE AND ACETAMINOPHEN 5; 325 MG/1; MG/1
1 TABLET ORAL EVERY 6 HOURS PRN
Qty: 60 TABLET | Refills: 0 | Status: SHIPPED | OUTPATIENT
Start: 2021-07-09 | End: 2021-11-16 | Stop reason: SDUPTHER

## 2021-07-09 RX ORDER — HYDROCODONE BITARTRATE AND ACETAMINOPHEN 5; 325 MG/1; MG/1
1 TABLET ORAL EVERY 6 HOURS PRN
Qty: 60 TABLET | Refills: 0 | Status: SHIPPED | OUTPATIENT
Start: 2021-09-09 | End: 2022-01-19 | Stop reason: SDUPTHER

## 2021-07-12 LAB
ALBUMIN SERPL ELPH-MCNC: 4.13 G/DL (ref 3.35–5.55)
ALPHA1 GLOB SERPL ELPH-MCNC: 0.26 G/DL (ref 0.17–0.41)
ALPHA2 GLOB SERPL ELPH-MCNC: 0.76 G/DL (ref 0.43–0.99)
B-GLOBULIN SERPL ELPH-MCNC: 1.07 G/DL (ref 0.5–1.1)
GAMMA GLOB SERPL ELPH-MCNC: 1.28 G/DL (ref 0.67–1.58)
PROT SERPL-MCNC: 7.5 G/DL (ref 6–8.4)

## 2021-07-13 LAB — PATHOLOGIST INTERPRETATION SPE: NORMAL

## 2021-07-14 LAB — INTERPRETATION SERPL IFE-IMP: NORMAL

## 2021-07-15 LAB — PATHOLOGIST INTERPRETATION IFE: NORMAL

## 2021-07-20 ENCOUNTER — PATIENT OUTREACH (OUTPATIENT)
Dept: ADMINISTRATIVE | Facility: HOSPITAL | Age: 86
End: 2021-07-20

## 2021-09-04 ENCOUNTER — NURSE TRIAGE (OUTPATIENT)
Dept: ADMINISTRATIVE | Facility: CLINIC | Age: 86
End: 2021-09-04

## 2021-09-04 ENCOUNTER — TELEPHONE (OUTPATIENT)
Dept: FAMILY MEDICINE | Facility: CLINIC | Age: 86
End: 2021-09-04

## 2021-09-04 DIAGNOSIS — G89.29 OTHER CHRONIC PAIN: ICD-10-CM

## 2021-09-04 RX ORDER — HYDROCODONE BITARTRATE AND ACETAMINOPHEN 5; 325 MG/1; MG/1
1 TABLET ORAL EVERY 6 HOURS PRN
Qty: 30 TABLET | Refills: 0 | Status: SHIPPED | OUTPATIENT
Start: 2021-09-04 | End: 2021-10-18 | Stop reason: SDUPTHER

## 2021-09-16 ENCOUNTER — PES CALL (OUTPATIENT)
Dept: ADMINISTRATIVE | Facility: CLINIC | Age: 86
End: 2021-09-16

## 2021-09-17 ENCOUNTER — TELEPHONE (OUTPATIENT)
Dept: FAMILY MEDICINE | Facility: CLINIC | Age: 86
End: 2021-09-17

## 2021-09-17 DIAGNOSIS — G89.29 OTHER CHRONIC PAIN: ICD-10-CM

## 2021-09-21 RX ORDER — FINASTERIDE 5 MG/1
TABLET, FILM COATED ORAL
Qty: 90 TABLET | Refills: 12 | Status: SHIPPED | OUTPATIENT
Start: 2021-09-21

## 2021-10-06 ENCOUNTER — PES CALL (OUTPATIENT)
Dept: ADMINISTRATIVE | Facility: CLINIC | Age: 86
End: 2021-10-06

## 2021-10-18 DIAGNOSIS — G89.29 OTHER CHRONIC PAIN: ICD-10-CM

## 2021-10-18 RX ORDER — HYDROCODONE BITARTRATE AND ACETAMINOPHEN 5; 325 MG/1; MG/1
1 TABLET ORAL EVERY 6 HOURS PRN
Qty: 30 TABLET | Refills: 0 | Status: SHIPPED | OUTPATIENT
Start: 2021-10-18 | End: 2021-10-19 | Stop reason: SDUPTHER

## 2021-10-19 DIAGNOSIS — G89.29 OTHER CHRONIC PAIN: ICD-10-CM

## 2021-10-20 RX ORDER — HYDROCODONE BITARTRATE AND ACETAMINOPHEN 5; 325 MG/1; MG/1
1 TABLET ORAL EVERY 6 HOURS PRN
Qty: 30 TABLET | Refills: 0 | Status: SHIPPED | OUTPATIENT
Start: 2021-10-20 | End: 2022-01-25 | Stop reason: SDUPTHER

## 2021-11-16 DIAGNOSIS — G89.29 OTHER CHRONIC PAIN: ICD-10-CM

## 2021-11-17 RX ORDER — HYDROCODONE BITARTRATE AND ACETAMINOPHEN 5; 325 MG/1; MG/1
1 TABLET ORAL EVERY 6 HOURS PRN
Qty: 60 TABLET | Refills: 0 | Status: SHIPPED | OUTPATIENT
Start: 2021-11-17 | End: 2021-12-17 | Stop reason: SDUPTHER

## 2021-12-10 ENCOUNTER — OFFICE VISIT (OUTPATIENT)
Dept: HOME HEALTH SERVICES | Facility: CLINIC | Age: 86
End: 2021-12-10
Payer: MEDICARE

## 2021-12-10 VITALS
SYSTOLIC BLOOD PRESSURE: 133 MMHG | HEART RATE: 55 BPM | BODY MASS INDEX: 18.83 KG/M2 | TEMPERATURE: 98 F | WEIGHT: 113 LBS | HEIGHT: 65 IN | DIASTOLIC BLOOD PRESSURE: 66 MMHG

## 2021-12-10 DIAGNOSIS — I48.0 PAROXYSMAL ATRIAL FIBRILLATION: ICD-10-CM

## 2021-12-10 DIAGNOSIS — N18.32 STAGE 3B CHRONIC KIDNEY DISEASE: ICD-10-CM

## 2021-12-10 DIAGNOSIS — F41.9 CHRONIC ANXIETY: ICD-10-CM

## 2021-12-10 DIAGNOSIS — M06.9 RHEUMATOID ARTHRITIS, INVOLVING UNSPECIFIED SITE, UNSPECIFIED WHETHER RHEUMATOID FACTOR PRESENT: ICD-10-CM

## 2021-12-10 DIAGNOSIS — F13.20 SEDATIVE DEPENDENCE: ICD-10-CM

## 2021-12-10 DIAGNOSIS — N40.0 BENIGN PROSTATIC HYPERPLASIA, UNSPECIFIED WHETHER LOWER URINARY TRACT SYMPTOMS PRESENT: ICD-10-CM

## 2021-12-10 DIAGNOSIS — E78.5 HYPERLIPIDEMIA, UNSPECIFIED HYPERLIPIDEMIA TYPE: ICD-10-CM

## 2021-12-10 DIAGNOSIS — Z00.00 ENCOUNTER FOR PREVENTIVE HEALTH EXAMINATION: Primary | ICD-10-CM

## 2021-12-10 DIAGNOSIS — I77.9 BILATERAL CAROTID ARTERY DISEASE, UNSPECIFIED TYPE: ICD-10-CM

## 2021-12-10 DIAGNOSIS — I10 ESSENTIAL HYPERTENSION: ICD-10-CM

## 2021-12-10 DIAGNOSIS — F39 MOOD DISORDER: ICD-10-CM

## 2021-12-10 DIAGNOSIS — Z74.09 OTHER REDUCED MOBILITY: ICD-10-CM

## 2021-12-10 PROCEDURE — 99499 UNLISTED E&M SERVICE: CPT | Mod: S$GLB,,, | Performed by: NURSE PRACTITIONER

## 2021-12-10 PROCEDURE — 99499 RISK ADDL DX/OHS AUDIT: ICD-10-PCS | Mod: S$GLB,,, | Performed by: NURSE PRACTITIONER

## 2021-12-10 PROCEDURE — G0439 PPPS, SUBSEQ VISIT: HCPCS | Mod: S$GLB,,, | Performed by: NURSE PRACTITIONER

## 2021-12-10 PROCEDURE — G0439 PR MEDICARE ANNUAL WELLNESS SUBSEQUENT VISIT: ICD-10-PCS | Mod: S$GLB,,, | Performed by: NURSE PRACTITIONER

## 2021-12-12 PROBLEM — F13.20 SEDATIVE DEPENDENCE: Status: ACTIVE | Noted: 2021-12-12

## 2021-12-12 PROBLEM — N18.32 STAGE 3B CHRONIC KIDNEY DISEASE: Status: ACTIVE | Noted: 2021-12-12

## 2021-12-17 DIAGNOSIS — G89.29 OTHER CHRONIC PAIN: ICD-10-CM

## 2021-12-17 RX ORDER — HYDROCODONE BITARTRATE AND ACETAMINOPHEN 5; 325 MG/1; MG/1
1 TABLET ORAL EVERY 6 HOURS PRN
Qty: 60 TABLET | Refills: 0 | Status: SHIPPED | OUTPATIENT
Start: 2021-12-17 | End: 2022-02-21 | Stop reason: SDUPTHER

## 2021-12-17 RX ORDER — HYDROCODONE BITARTRATE AND ACETAMINOPHEN 5; 325 MG/1; MG/1
1 TABLET ORAL EVERY 6 HOURS PRN
Qty: 60 TABLET | Refills: 0 | OUTPATIENT
Start: 2022-01-17

## 2021-12-17 RX ORDER — HYDROCODONE BITARTRATE AND ACETAMINOPHEN 5; 325 MG/1; MG/1
1 TABLET ORAL EVERY 6 HOURS PRN
Qty: 60 TABLET | Refills: 0 | OUTPATIENT
Start: 2022-02-17

## 2021-12-22 ENCOUNTER — TELEPHONE (OUTPATIENT)
Dept: FAMILY MEDICINE | Facility: CLINIC | Age: 86
End: 2021-12-22
Payer: MEDICARE

## 2022-01-19 DIAGNOSIS — G89.29 OTHER CHRONIC PAIN: ICD-10-CM

## 2022-01-19 RX ORDER — HYDROCODONE BITARTRATE AND ACETAMINOPHEN 5; 325 MG/1; MG/1
1 TABLET ORAL EVERY 6 HOURS PRN
Qty: 60 TABLET | Refills: 0 | Status: SHIPPED | OUTPATIENT
Start: 2022-01-19 | End: 2022-02-23 | Stop reason: SDUPTHER

## 2022-01-19 NOTE — TELEPHONE ENCOUNTER
----- Message from Malgorzata Mae sent at 1/19/2022 10:48 AM CST -----  .Type: RX Refill Request    Who Called: self     Have you contacted your pharmacy: no     Refill or New Rx:#refill    RX Name and Strength:HYDROcodone-acetaminophen (NORCO) 5-325 mg per tablet    How is the patient currently taking it? (ex. 1XDay):     Is this a 30 day or 90 day RX: patient states 90     Preferred Pharmacy with phone number: [facility] - [facility] [address]   Phone:  [phone]  Fax:  [phone]          Local or Mail Order: mail     Ordering Provider: Dr Cortez     Would the patient rather a call back or a response via [facility]?  Call bk     Best Call Back Number: [phone] (home) [phone] (work)

## 2022-01-24 DIAGNOSIS — G89.29 OTHER CHRONIC PAIN: ICD-10-CM

## 2022-01-24 RX ORDER — HYDROCODONE BITARTRATE AND ACETAMINOPHEN 5; 325 MG/1; MG/1
1 TABLET ORAL EVERY 6 HOURS PRN
Qty: 30 TABLET | Refills: 0 | Status: CANCELLED | OUTPATIENT
Start: 2022-01-24

## 2022-01-24 NOTE — TELEPHONE ENCOUNTER
No new care gaps identified.  Powered by [employer] by [employer]. Reference number: [id number].   1/24/2022 3:37:12 PM CST

## 2022-01-24 NOTE — TELEPHONE ENCOUNTER
----- Message from Luzmaria Renae sent at 1/24/2022  3:25 PM CST -----  Contact: Patient [phone]  Type: RX Refill Request    Who Called: Patient     Have you contacted your pharmacy: Yes. Medication was sent to [facility]. Patient states that he's in pain and will need something sent to Local Pharmacy until he can get the Mail Order medications.    Refill or New Rx: Refill    RX Name and Strength: HYDROcodone-acetaminophen (NORCO) 5-325 mg per tablet    Is this a 30 day or 90 day RX: 90 day    Preferred Pharmacy with phone number: .    [facility]/pharmacy #[id number] - [place], [place] - [address] [facility]  [address] [facility] [address]  Phone: [phone] Fax: [phone]    Local or Mail Order: Local    Would the patient rather a call back or a response via [facility]? Call back    Best Call Back Number: [phone]    Additional Information: Please call in something to Local Pharmacy until he receives the Mail Order Pharmacy medication.Patient states that he's in pain and need pain medication ASAP! Please call in to [facility] and call pt once this is done.

## 2022-01-25 DIAGNOSIS — G89.29 OTHER CHRONIC PAIN: ICD-10-CM

## 2022-01-25 NOTE — TELEPHONE ENCOUNTER
----- Message from Luzmaria Renae sent at 1/25/2022  3:14 PM CST -----  Contact: Patient [phone]  Type: Patient Call Back    Who called:Patient     What is the request in detail: Need to speak to nurse in regards to HYDROcodone-acetaminophen (NORCO) 5-325 mg per tablet that was supposed to be delivered today. Patient states that [employer] was supposed to deliver it. States he didn't get anything in the mail. Wondering if someone else will be delivering his medication today? Please call pt.    Would the patient rather a call back or a response via [facility]? Call back    Best call back number: [phone]

## 2022-01-25 NOTE — TELEPHONE ENCOUNTER
----- Message from Yara Servin sent at 1/25/2022  9:36 AM CST -----  Type: Patient Call Back       What is the request in detail:  pt calling to check status on medication.      Can the clinic reply by [facility]? No       Would the patient rather a call back or a response via [facility]? Call back       Best call back number: [phone]        Thank you.

## 2022-01-25 NOTE — TELEPHONE ENCOUNTER
----- Message from Belén Villela sent at 1/24/2022  5:48 PM CST -----  Type:  RX Refill Request    Who Called: pt    RX Name and Strength: HYDROcodone-acetaminophen (NORCO) 5-325 mg per tablet        How is the patient currently taking it? (ex. 1XDay): 2 x a day    Is this a 30 day or 90 day RX: 30    Preferred Pharmacy with phone number: [facility]/pharmacy #[id number] [address] [place]   Phone:  [phone]  Fax:  [phone]          Local or Mail Order:  local    Ordering Provider:  Dr MCKINNEY    Would the patient rather a call back or a response via [facility]?  Call     Best Call Back Number: [phone]    Additional Information: refill

## 2022-01-25 NOTE — TELEPHONE ENCOUNTER
Spoke with [facility] pharmacy pt's rx was sent off on 1/20 and it's showing he should receive it today.

## 2022-01-25 NOTE — TELEPHONE ENCOUNTER
No new care gaps identified.  Powered by [employer] by [employer]. Reference number: [id number].   1/25/2022 10:28:40 AM CST

## 2022-01-26 ENCOUNTER — TELEPHONE (OUTPATIENT)
Dept: FAMILY MEDICINE | Facility: CLINIC | Age: 87
End: 2022-01-26
Payer: MEDICARE

## 2022-01-26 RX ORDER — HYDROCODONE BITARTRATE AND ACETAMINOPHEN 5; 325 MG/1; MG/1
1 TABLET ORAL EVERY 6 HOURS PRN
Qty: 14 TABLET | Refills: 0 | Status: SHIPPED | OUTPATIENT
Start: 2022-01-26 | End: 2022-03-24

## 2022-02-21 ENCOUNTER — TELEPHONE (OUTPATIENT)
Dept: FAMILY MEDICINE | Facility: CLINIC | Age: 87
End: 2022-02-21
Payer: MEDICARE

## 2022-02-21 DIAGNOSIS — G89.29 OTHER CHRONIC PAIN: ICD-10-CM

## 2022-02-21 NOTE — TELEPHONE ENCOUNTER
----- Message from Aline Brunner sent at 2022  5:04 PM CST -----  Needs advice from nurse:      Who Called:pt  Regardinnd request for refill on HYDROcodone-acetaminophen (NORCO) 5-325 mg per tablet  Take 1 tablet by mouth every 6 (six) hours as needed      Would the patient rather a call back or VIA [facility]?  Best Call Back number:[phone]  Additional Info:[facility]/pharmacy #[id number] - [place], [place] - [address] [place] [address] (Ph: [phone])

## 2022-02-21 NOTE — TELEPHONE ENCOUNTER
No new care gaps identified.  Powered by [employer] by [employer]. Reference number: [id number].   2/21/2022 11:25:51 AM CST

## 2022-02-21 NOTE — TELEPHONE ENCOUNTER
Informed pt we did receive his request and forward it to the provider just waiting on doctor to finish up with today's clinic.

## 2022-02-21 NOTE — TELEPHONE ENCOUNTER
----- Message from Malgorzata Mae sent at 2/21/2022 11:19 AM CST -----  Type: RX Refill Request    Who Called: self     Have you contacted your pharmacy: yes     Refill or New Rx: Refill    RX Name and Strength: HYDROcodone-acetaminophen (NORCO) 5-325 mg per tablet    How is the patient currently taking it? (ex. 1XDay):    Is this a 30 day or 90 day RX:    Preferred Pharmacy with phone number:        [facility]/pharmacy #[id number] [place], [place] - [address] [address]  Phone: [phone] Fax: [phone]

## 2022-02-22 RX ORDER — HYDROCODONE BITARTRATE AND ACETAMINOPHEN 5; 325 MG/1; MG/1
1 TABLET ORAL EVERY 6 HOURS PRN
Qty: 60 TABLET | Refills: 0 | OUTPATIENT
Start: 2022-03-21

## 2022-02-22 RX ORDER — HYDROCODONE BITARTRATE AND ACETAMINOPHEN 5; 325 MG/1; MG/1
1 TABLET ORAL EVERY 6 HOURS PRN
Qty: 60 TABLET | Refills: 0 | Status: SHIPPED | OUTPATIENT
Start: 2022-04-21 | End: 2022-03-24 | Stop reason: SDUPTHER

## 2022-02-22 RX ORDER — HYDROCODONE BITARTRATE AND ACETAMINOPHEN 5; 325 MG/1; MG/1
1 TABLET ORAL EVERY 6 HOURS PRN
Qty: 14 TABLET | Refills: 0 | OUTPATIENT
Start: 2022-02-22

## 2022-02-22 NOTE — TELEPHONE ENCOUNTER
----- Message from Raheem Mares sent at 2/22/2022  4:37 PM CST -----  Regarding: Refill f/u  Contact: MARK MCKEON JR. [[id number]]  Name of Who is Calling: MARK MCKEON JR. [[id number]]      What is the request in detail: Would like to speak with staff in regards to following up on refill for [place]. Patient is asking for a callback when put in, to call son to  medication. Please advise      Can the clinic reply by [facility]: no      What Number to Call Back if not in [facility]: [phone]

## 2022-02-22 NOTE — TELEPHONE ENCOUNTER
----- Message from Jeanine Fitch sent at 2/22/2022  2:05 PM CST -----  Regarding: Self [phone] Pateint is out of medication  Type: RX Refill Request    Who Called:  self    Have you contacted your pharmacy: yes    Refill or New Rx: refill    RX Name and Strength:HYDROcodone-acetaminophen (NORCO) 5-325 mg per tablet    Preferred Pharmacy with phone number:   [facility]/pharmacy #[id number] [place] [id number] [address]  Phone: [phone] Fax: [phone]      Local or Mail Order: local    Would the patient rather a call back or a response via My [facility]? Call back    Best Call Back Number: [phone]    Patient is out of medication

## 2022-02-23 ENCOUNTER — TELEPHONE (OUTPATIENT)
Dept: FAMILY MEDICINE | Facility: CLINIC | Age: 87
End: 2022-02-23
Payer: MEDICARE

## 2022-02-23 DIAGNOSIS — G89.29 OTHER CHRONIC PAIN: ICD-10-CM

## 2022-02-23 RX ORDER — HYDROCODONE BITARTRATE AND ACETAMINOPHEN 5; 325 MG/1; MG/1
1 TABLET ORAL EVERY 6 HOURS PRN
Qty: 14 TABLET | Refills: 0 | OUTPATIENT
Start: 2022-02-23

## 2022-02-23 RX ORDER — HYDROCODONE BITARTRATE AND ACETAMINOPHEN 5; 325 MG/1; MG/1
1 TABLET ORAL EVERY 6 HOURS PRN
Qty: 60 TABLET | Refills: 0 | Status: SHIPPED | OUTPATIENT
Start: 2022-02-23 | End: 2022-03-24 | Stop reason: SDUPTHER

## 2022-02-23 RX ORDER — HYDROCODONE BITARTRATE AND ACETAMINOPHEN 5; 325 MG/1; MG/1
1 TABLET ORAL EVERY 6 HOURS PRN
Qty: 60 TABLET | Refills: 0 | Status: CANCELLED | OUTPATIENT
Start: 2022-02-23

## 2022-02-23 NOTE — TELEPHONE ENCOUNTER
No new care gaps identified.  Powered by [employer]. Reference number: [id number].   2/23/2022 8:49:40 AM CST

## 2022-02-23 NOTE — TELEPHONE ENCOUNTER
----- Message from Josefina Cornell sent at 2/23/2022  3:29 PM CST -----  Regarding: self  .Type: Patient Call Back    Who called: self     What is the request in detail: patient states he would like to speak with Ev again regarding previous conversation. Please call.     Can the clinic reply by [facility]?    Would the patient rather a call back or a response via [facility]? Call     Best call back number: .[phone]

## 2022-02-23 NOTE — TELEPHONE ENCOUNTER
Pt called back stating his son will bring him in clinic whenever he needs to so he can get his refill.

## 2022-02-23 NOTE — TELEPHONE ENCOUNTER
----- Message from Yara Servin sent at 2/23/2022  8:41 AM CST -----  Refill:HYDROcodone-acetaminophen (NORCO) 5-325 mg per tablet          [facility] - [place], [place] - [address]  [address]  [facility] [address]  Phone: [phone] Fax: [phone]

## 2022-02-23 NOTE — TELEPHONE ENCOUNTER
Pt is requesting an earlier appointment than 3/24 he can not go without his medication for that long he wont be able to walk.  He stated [facility] only [facility] 14 pills [facility].  He's requesting more medication until his son can bring him in.

## 2022-02-23 NOTE — TELEPHONE ENCOUNTER
----- Message from Eliane Almendarez sent at 2/22/2022  6:13 PM CST -----  Contact: MARK MCKEON JR. [[id number]] [phone]  Type:  RX Refill Request    Who Called: MARK MCKEON JR. [[id number]]  Refill or New Rx: Refill  RX Name and Strength: HYDROcodone-acetaminophen (NORCO) 5-325 mg per tablet  How is the patient currently taking it? (ex. 1XDay): Take 1 tablet by mouth every 6 (six) hours as needed. - Oral  Is this a 30 day or 90 day RX: 90  Preferred Pharmacy with phone number: [facility] - [facility] [address]  Local or Mail Order: Mail  Ordering Provider: Dr. Coleman Cortez  Would the patient rather a call back or a response via [facility]? Phone call  Best Call Back Number: [phone]  Additional Information: Pt indicated his pharmacy was able to fill 5-6 doses for him.

## 2022-02-23 NOTE — TELEPHONE ENCOUNTER
At [facility], we strive to deliver an exceptional experience to you, every time we see you.  If you receive a survey in the mail, please send us back your thoughts. We really do value your feedback.    Based on your medical history, these are the current health maintenance/preventive care services that you are due for (some may have been done at this visit.)  Health Maintenance Due   Topic Date Due     HEPATITIS C SCREENING  1960     ADVANCE CARE PLANNING  1960     ZOSTER IMMUNIZATION (1 of 2) 06/06/2010     INFLUENZA VACCINE (1) 09/01/2019         Suggested websites for health information:  Www.[employer].F[employer]plife : Up to date and easily searchable information on multiple topics.  Www.medlineplus.gov : medication info, interactive tutorials, watch real surgeries online  Www.familydoctor.org : good info from the Academy of Family Physicians  Www.cdc.gov : public health info, travel advisories, epidemics (H1N1)  Www.aap.org : children's health info, normal development, vaccinations  Www.health.[facility].[place].us : [place] dept of health, public health issues in [place], N1N1    Your care team:                            Family Medicine Internal Medicine   MD Ky Crawley MD Shantel Branch-Fleming, MD Katya Georgiev PA-C Nam Ho, MD Pediatrics   CURTIS Davis, ABELARDO Denton APRN CNP   MD Ronda Angelo MD Deborah Mielke, MD Kim Thein, APRN [facility]      Clinic hours: Monday - Thursday 7 am-7 pm; Fridays 7 am-5 pm.   Urgent care: Monday - Friday 11 am-9 pm; Saturday and Sunday 9 am-5 pm.  Pharmacy : Monday -Thursday 8 am-8 pm; Friday 8 am-6 pm; Saturday and Sunday 9 am-5 pm.     Clinic: [phone]   Pharmacy: [phone]    Patient Education     Molluscum Contagiosum (Adult)  Molluscum contagiosum is a common skin infection. It is caused by a pox virus. The infection results in raised, flesh-colored bumps with central indentations on  This Rx Request does not qualify for assessment with the OR.    Please review protocol details and the Care Due Message for extra clinical information    Reasons Rx Request may be deferred:  1. Labs/Vitals overdue  2. Labs/Vitals abnormal  3. Patient has been seen in the ED/Hospital since the last PCP visit  4. Medication is non-delegated  5. Provider is a non-participating provider     the skin. The bumps are sometimes itchy, but not painful. They may spread or form lines when scratched. Almost any area of skin can be affected. Common sites include the face, neck, armpit, arms, hands, and genitals.    Molluscum contagiosum spreads easily from one part of the body to another. It spreads through scratching or other contact. It can also spread from person to person. This often happens through shared clothing, towels, or objects such as shared sports gear. It can spread during contact sports or sexual contact.  Because it is caused by a virus, antibiotics don't help. The infection usually goes away on its own within a period of 6 to 18 months, but may spread if not treated. The infection may continue in people with a weak immune system. This includes people with diabetes, cancer, or HIV.  If the bumps are bothersome or unsightly, treatment may remove them. This may include scraping, freezing, or by applying an acid, blistering solution, or a cream that modulates the immune system.  Home care  Your healthcare provider can prescribe a medicine to help the bumps heal. Follow the provider s instructions for using these medicines.    The following are general care guidelines:    Don't scratch the rash. Scratching spreads the infection. If needed, cover affected skin with bandages to help prevent scratching.    Wash your hands before and after caring for the rash.    Don't share towels, washcloths, or clothing with anyone.    Don't shave any areas where the bumps are present.    Don't have sex if the bumps are in the genital area.    If participating in contact sports or other activity that involves skin-to-skin contact, cover all affected skin with clothing or bandages.    Don't swim in public pools until the rash clears.  Follow-up care  Follow up with your healthcare provider, or as advised.  When to seek medical advice  Call your healthcare provider right away if any of these occur:    Fever of 100.4 F  (38 C) or higher    Signs of infection, such as warmth, pain, oozing, or redness    Bumps appear on a new area of the body or seem to be spreading rapidly   Date Last Reviewed: 6/1/2018 2000-2018 [employer]. [address] [place] [address]. All rights reserved. This information is not intended as a substitute for professional medical care. Always follow your healthcare professional's instructions.

## 2022-02-24 NOTE — TELEPHONE ENCOUNTER
Reassure there is no problem.  We sent in the full prescription so the restricted amount is something that the pharmacy did for unknown reason.  The intent was to give him the full prescription and have him come in just to be checked up on as scheduled    Dr. Cortez will send the prescription back telling them to fill the whole prescription

## 2022-03-04 ENCOUNTER — TELEPHONE (OUTPATIENT)
Dept: FAMILY MEDICINE | Facility: CLINIC | Age: 87
End: 2022-03-04
Payer: MEDICARE

## 2022-03-04 NOTE — TELEPHONE ENCOUNTER
----- Message from Mary Ramsey MA sent at 3/4/2022  3:31 PM CST -----  Type: Patient Call Back    Who called:self    What is the request in detail:pt. Is asking if he can get a notice in the mail of his upcoming appt..     Can the clinic reply by [facility]?no    Would the patient rather a call back or a response via [facility]? yes    Best call back number:[phone] (home)

## 2022-03-24 ENCOUNTER — OFFICE VISIT (OUTPATIENT)
Dept: FAMILY MEDICINE | Facility: CLINIC | Age: 87
End: 2022-03-24
Payer: MEDICARE

## 2022-03-24 ENCOUNTER — LAB VISIT (OUTPATIENT)
Dept: LAB | Facility: HOSPITAL | Age: 87
End: 2022-03-24
Attending: INTERNAL MEDICINE
Payer: MEDICARE

## 2022-03-24 VITALS
HEIGHT: 65 IN | DIASTOLIC BLOOD PRESSURE: 56 MMHG | HEART RATE: 78 BPM | OXYGEN SATURATION: 99 % | BODY MASS INDEX: 18.8 KG/M2 | TEMPERATURE: 98 F | SYSTOLIC BLOOD PRESSURE: 122 MMHG

## 2022-03-24 DIAGNOSIS — F11.20 OPIATE DEPENDENCE, CONTINUOUS: ICD-10-CM

## 2022-03-24 DIAGNOSIS — F39 MOOD DISORDER: ICD-10-CM

## 2022-03-24 DIAGNOSIS — Z86.73 HISTORY OF STROKE: ICD-10-CM

## 2022-03-24 DIAGNOSIS — F41.9 CHRONIC ANXIETY: ICD-10-CM

## 2022-03-24 DIAGNOSIS — G89.29 OTHER CHRONIC PAIN: ICD-10-CM

## 2022-03-24 DIAGNOSIS — I48.0 PAROXYSMAL ATRIAL FIBRILLATION: ICD-10-CM

## 2022-03-24 DIAGNOSIS — M06.9 RHEUMATOID ARTHRITIS, INVOLVING UNSPECIFIED SITE, UNSPECIFIED WHETHER RHEUMATOID FACTOR PRESENT: ICD-10-CM

## 2022-03-24 DIAGNOSIS — D64.9 ANEMIA, UNSPECIFIED TYPE: ICD-10-CM

## 2022-03-24 DIAGNOSIS — Z00.00 ROUTINE MEDICAL EXAM: ICD-10-CM

## 2022-03-24 DIAGNOSIS — I10 ESSENTIAL HYPERTENSION: ICD-10-CM

## 2022-03-24 DIAGNOSIS — I77.9 BILATERAL CAROTID ARTERY DISEASE, UNSPECIFIED TYPE: ICD-10-CM

## 2022-03-24 DIAGNOSIS — Z79.01 ANTICOAGULATED ON COUMADIN: ICD-10-CM

## 2022-03-24 DIAGNOSIS — N18.30 STAGE 3 CHRONIC KIDNEY DISEASE, UNSPECIFIED WHETHER STAGE 3A OR 3B CKD: ICD-10-CM

## 2022-03-24 DIAGNOSIS — Z00.00 ROUTINE MEDICAL EXAM: Primary | ICD-10-CM

## 2022-03-24 DIAGNOSIS — F13.20 SEDATIVE DEPENDENCE: ICD-10-CM

## 2022-03-24 DIAGNOSIS — E78.5 HYPERLIPIDEMIA, UNSPECIFIED HYPERLIPIDEMIA TYPE: ICD-10-CM

## 2022-03-24 DIAGNOSIS — I70.1 RAS (RENAL ARTERY STENOSIS): ICD-10-CM

## 2022-03-24 LAB
ALBUMIN SERPL BCP-MCNC: 3.5 G/DL (ref 3.5–5.2)
ALP SERPL-CCNC: 97 U/L (ref 55–135)
ALT SERPL W/O P-5'-P-CCNC: 29 U/L (ref 10–44)
ANION GAP SERPL CALC-SCNC: 9 MMOL/L (ref 8–16)
AST SERPL-CCNC: 25 U/L (ref 10–40)
BASOPHILS # BLD AUTO: 0.05 K/UL (ref 0–0.2)
BASOPHILS NFR BLD: 1.1 % (ref 0–1.9)
BILIRUB SERPL-MCNC: 0.4 MG/DL (ref 0.1–1)
BUN SERPL-MCNC: 25 MG/DL (ref 10–30)
CALCIUM SERPL-MCNC: 9.5 MG/DL (ref 8.7–10.5)
CHLORIDE SERPL-SCNC: 106 MMOL/L (ref 95–110)
CO2 SERPL-SCNC: 25 MMOL/L (ref 23–29)
CREAT SERPL-MCNC: 1.4 MG/DL (ref 0.5–1.4)
DIFFERENTIAL METHOD: ABNORMAL
EOSINOPHIL # BLD AUTO: 0.2 K/UL (ref 0–0.5)
EOSINOPHIL NFR BLD: 4.3 % (ref 0–8)
ERYTHROCYTE [DISTWIDTH] IN BLOOD BY AUTOMATED COUNT: 13.8 % (ref 11.5–14.5)
EST. GFR  (AFRICAN AMERICAN): 50.4 ML/MIN/1.73 M^2
EST. GFR  (NON AFRICAN AMERICAN): 43.6 ML/MIN/1.73 M^2
GLUCOSE SERPL-MCNC: 108 MG/DL (ref 70–110)
HCT VFR BLD AUTO: 36.5 % (ref 40–54)
HGB BLD-MCNC: 11.2 G/DL (ref 14–18)
IMM GRANULOCYTES # BLD AUTO: 0.01 K/UL (ref 0–0.04)
IMM GRANULOCYTES NFR BLD AUTO: 0.2 % (ref 0–0.5)
LYMPHOCYTES # BLD AUTO: 1 K/UL (ref 1–4.8)
LYMPHOCYTES NFR BLD: 22.2 % (ref 18–48)
MCH RBC QN AUTO: 29.5 PG (ref 27–31)
MCHC RBC AUTO-ENTMCNC: 30.7 G/DL (ref 32–36)
MCV RBC AUTO: 96 FL (ref 82–98)
MONOCYTES # BLD AUTO: 0.9 K/UL (ref 0.3–1)
MONOCYTES NFR BLD: 18.5 % (ref 4–15)
NEUTROPHILS # BLD AUTO: 2.5 K/UL (ref 1.8–7.7)
NEUTROPHILS NFR BLD: 53.7 % (ref 38–73)
NRBC BLD-RTO: 0 /100 WBC
PLATELET # BLD AUTO: 248 K/UL (ref 150–450)
PMV BLD AUTO: 9.9 FL (ref 9.2–12.9)
POTASSIUM SERPL-SCNC: 5 MMOL/L (ref 3.5–5.1)
PROT SERPL-MCNC: 7.1 G/DL (ref 6–8.4)
RBC # BLD AUTO: 3.8 M/UL (ref 4.6–6.2)
SODIUM SERPL-SCNC: 140 MMOL/L (ref 136–145)
TSH SERPL DL<=0.005 MIU/L-ACNC: 1.46 UIU/ML (ref 0.4–4)
WBC # BLD AUTO: 4.65 K/UL (ref 3.9–12.7)

## 2022-03-24 PROCEDURE — 3288F PR FALLS RISK ASSESSMENT DOCUMENTED: ICD-10-PCS | Mod: CPTII,S$GLB,, | Performed by: INTERNAL MEDICINE

## 2022-03-24 PROCEDURE — 3288F FALL RISK ASSESSMENT DOCD: CPT | Mod: CPTII,S$GLB,, | Performed by: INTERNAL MEDICINE

## 2022-03-24 PROCEDURE — 80053 COMPREHEN METABOLIC PANEL: CPT | Performed by: INTERNAL MEDICINE

## 2022-03-24 PROCEDURE — 1101F PT FALLS ASSESS-DOCD LE1/YR: CPT | Mod: CPTII,S$GLB,, | Performed by: INTERNAL MEDICINE

## 2022-03-24 PROCEDURE — 99214 OFFICE O/P EST MOD 30 MIN: CPT | Mod: 25,S$GLB,, | Performed by: INTERNAL MEDICINE

## 2022-03-24 PROCEDURE — 36415 COLL VENOUS BLD VENIPUNCTURE: CPT | Mod: PO | Performed by: INTERNAL MEDICINE

## 2022-03-24 PROCEDURE — 84443 ASSAY THYROID STIM HORMONE: CPT | Performed by: INTERNAL MEDICINE

## 2022-03-24 PROCEDURE — 1159F MED LIST DOCD IN RCRD: CPT | Mod: CPTII,S$GLB,, | Performed by: INTERNAL MEDICINE

## 2022-03-24 PROCEDURE — 99397 PER PM REEVAL EST PAT 65+ YR: CPT | Mod: S$GLB,,, | Performed by: INTERNAL MEDICINE

## 2022-03-24 PROCEDURE — 99999 PR PBB SHADOW E&M-EST. PATIENT-LVL III: ICD-10-PCS | Mod: PBBFAC,,, | Performed by: INTERNAL MEDICINE

## 2022-03-24 PROCEDURE — 99999 PR PBB SHADOW E&M-EST. PATIENT-LVL III: CPT | Mod: PBBFAC,,, | Performed by: INTERNAL MEDICINE

## 2022-03-24 PROCEDURE — 99214 PR OFFICE/OUTPT VISIT, EST, LEVL IV, 30-39 MIN: ICD-10-PCS | Mod: 25,S$GLB,, | Performed by: INTERNAL MEDICINE

## 2022-03-24 PROCEDURE — 1101F PR PT FALLS ASSESS DOC 0-1 FALLS W/OUT INJ PAST YR: ICD-10-PCS | Mod: CPTII,S$GLB,, | Performed by: INTERNAL MEDICINE

## 2022-03-24 PROCEDURE — 99397 PR PREVENTIVE VISIT,EST,65 & OVER: ICD-10-PCS | Mod: S$GLB,,, | Performed by: INTERNAL MEDICINE

## 2022-03-24 PROCEDURE — 85025 COMPLETE CBC W/AUTO DIFF WBC: CPT | Performed by: INTERNAL MEDICINE

## 2022-03-24 PROCEDURE — 1159F PR MEDICATION LIST DOCUMENTED IN MEDICAL RECORD: ICD-10-PCS | Mod: CPTII,S$GLB,, | Performed by: INTERNAL MEDICINE

## 2022-03-24 RX ORDER — HYDROCODONE BITARTRATE AND ACETAMINOPHEN 5; 325 MG/1; MG/1
1 TABLET ORAL EVERY 6 HOURS PRN
Qty: 60 TABLET | Refills: 0 | Status: SHIPPED | OUTPATIENT
Start: 2022-04-24 | End: 2022-06-24 | Stop reason: SDUPTHER

## 2022-03-24 RX ORDER — HYDROCODONE BITARTRATE AND ACETAMINOPHEN 5; 325 MG/1; MG/1
1 TABLET ORAL EVERY 6 HOURS PRN
Qty: 60 TABLET | Refills: 0 | Status: SHIPPED | OUTPATIENT
Start: 2022-05-24 | End: 2022-06-24 | Stop reason: SDUPTHER

## 2022-03-24 RX ORDER — HYDROCODONE BITARTRATE AND ACETAMINOPHEN 5; 325 MG/1; MG/1
1 TABLET ORAL EVERY 6 HOURS PRN
Qty: 60 TABLET | Refills: 0 | Status: SHIPPED | OUTPATIENT
Start: 2022-03-24 | End: 2022-06-24 | Stop reason: SDUPTHER

## 2022-03-24 NOTE — PROGRESS NOTES
CHIEF COMPLAINT:  Physical.                                                                                                                     HISTORY OF PRESENT ILLNESS:  An 89 year-old white male previously went to the gym but not doing so much    He simply just does not have an appetite.  Looks like it did lose about 15 lb over the last year .  He lives next to his son who does bring in food.  He does not cook for himself.  Son says he sometimes eats very well.       He is a nonsmoker.  His chestx-ray in 2008 was normal.  Colonoscopy in 2006, normal with next in 10  Years and not likely clinically indicated based upon his age.      He has had PSAs in the past but also not likely indicated to continue to follow the previously normal PSA based upon his age  .                                                                                          REVIEW OF SYSTEMS:  Weight  stable currently   .  No fevers or chills.  No    vision or ENT symptoms.  No dysphagia.  No chest pain, shortness of breath.   No GI or  problems that are new.  No new myalgias or arthralgias.  No     new skin rashes.  All other complete review of systems negative.                                                                                          PAST MEDICAL HISTORY:                                                        1. Hypertension.                                                             2. Hyperlipidemia.                                                           3. Severe knee DJD.                                                          4. Questionable rheumatoid arthritis, the patient presented to our clinic on prednisone 5 mg, started by a rheumatologist Dr. Dao. The patient  denied ever having any hand, DJD or arthritis problems.                      5. Remote history of peptic ulcer disease requiring surgery.                 6. Lumbar disk surgery by Dr. Mckay.                                      7. Tinea  corporis.                                                           8. Nonspecific stress echo at [facility] 2000.                                  9. Cardiac angiogram apparently at [facility] with chest pain that was  unremarkable, no report.                                                     10. Chronic anxiety and occasional panic attacks.                            11. ED.                                                                      12. Gout.                                                                    13. Prostatitis/ BPH -Dr Ranjit Reyna                                                         14. Neck pain with numbness in both arms, assessed by Dr. Mackey  15.  Atrial fibrillation.    Sandra                                                 16.  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                                                     17.  Severe carotid disease, 75% right internal carotid, 50% left internal  carotid and 70% left external carotid.     18.   Colonoscopy in 2006, normal                                                                                                                                                                                                                                                        SOCIAL HISTORY: Does not smoke or drink.                                                                                                                  FAMILY HISTORY: Unremarkable.                                   PHYSICAL EXAMINATION:                                                        VITAL SIGNS:   as above    GENERAL:  Pleasant thin male.                                                HEENT:  Conjunctivae clear.  Pupils equal and reactive.  Nose and mouth      clear and moist.  Teeth edentulous.  Impaction on right removed with pick by MD himself                                        NECK:  Supple.  Thyroid nonpalpable.                                         RESPIRATORY:  Effort is  good.                                                LUNGS:  All clear.                                                           HEART:  Regular rate and rhythm without murmurs, gallops or rubs.  No        carotid bruits.  No edema.                                                   ABDOMEN:  Soft, nondistended, nontender.  No hepatosplenomegaly.             SKIN:  No rashes.  Warm to touch.                                            EXTREMITIES:  Gait normal.  Digits without clubbing or cyanosis.                                                                                          Labs and x-ray reports reviewed as was colonoscopy.                                                                                                         Mariano was seen today for cerumen impaction.    Diagnoses and all orders for this visit:    Routine medical exam, we'll update lab work.  Pursuing further colonoscopy and PSA are not appropriate upon his age.  He will continue to be active.                                             Additional evaluation and management issues:    Additionally, patient has numerous other total issues to address today.  Reviewed outside records. Last labs 7/21.  Reviewed that he had his hydrocele removed and then when restarting Coumadin he had a large hematoma apparently and had to stay in the hospital with a drain.  We reviewed his prior labs and he has had a history of mild hyperkalemia and CKD which was done recently at  and stable.  He has rheumatoid arthritis but his clinical disease appears stable.  He has hyperlipidemia which has been assessed in the past and has been normal so we will defer and he has had it checked recently by his cardiologist.     He is doing well.  He has good family support.  Just has no appetite despite the Remeron 45. He does heat ice cream and I advised him to eat ice cream 3 times a day if necessary.  Family says they do bring him nutrition bars.  He typically  eats a banana in the morning but he can have the ice cream with it.  His weight is stable.  Last year 111 of this year 113. No dysphagia or other worrisome signs or symptoms.  We will update some routine labs.      He did have COVID but mild symptoms.      He sees the heart clinic for his atrial fibrillation and appears they had his Coumadin change to an alternate anticoagulant due to the recent hematoma issues I presume.     He does have a history of stroke and blood pressures under good control and he has had no interval neurological deficits.  He has had a slight hemoglobin reduction which  Is better.  His essential hypertension has been under good control.  His weight is been stable but we will check a TSH.       Librium at night for sleep and he has done so chronically without side effects and he was on this particular pattern of medications prior to being under my care.  Apparently not on Librium since it was not covered by his insurance.     Continues to follow up with what sounds like an outside vascular who recently checked his carotids and no intervention was planned.  No TIA symptoms.     .  All the separate issues reviewed and patient counseled an evaluation and management will be based upon MDM    Continues to follow up with Dr. Madsen in Ophthalmology for his macular degeneration.  He did get some injections in the past but not currently            Assessment and plan:        Other chronic pain, high risk medication with high risk of complications and side effects address today.  Although he is 89 he is on a lot of chronic pain and medication allows him to function and have some quality of life.  I will send three prescription advised him to call and remind us that he needs his three prescriptions again.  -     HYDROcodone-acetaminophen (NORCO) 5-325 mg per tablet; Take 1 tablet by mouth every 6 (six) hours as needed.  -     HYDROcodone-acetaminophen (NORCO) 5-325 mg per tablet; Take 1 tablet by mouth  every 6 (six) hours as needed.  -     HYDROcodone-acetaminophen (NORCO) 5-325 mg per tablet; Take 1 tablet by mouth every 6 (six) hours as needed.    Opiate dependence, continuous    Essential hypertension, chronic and stable  -     Comprehensive Metabolic Panel; Future  -     CBC Auto Differential; Future  -     TSH; Future    Chronic anxiety, chronic and stable    Sedative dependence    Mood disorder    History of stroke    TO (renal artery stenosis), good blood pressure control, check kidney function    Hyperlipidemia, unspecified hyperlipidemia type    Bilateral carotid artery disease, unspecified type    Anticoagulated on Coumadin  -     CBC Auto Differential; Future    Anemia, unspecified type, check for worsening on Coumadin  -     CBC Auto Differential; Future    Paroxysmal atrial fibrillation    Stage 3 chronic kidney disease, unspecified whether stage 3a or 3b CKD  -     Comprehensive Metabolic Panel; Future    Rheumatoid arthritis, involving unspecified site, unspecified whether rheumatoid factor present

## 2022-04-20 RX ORDER — SERTRALINE HYDROCHLORIDE 25 MG/1
TABLET, FILM COATED ORAL
Qty: 90 TABLET | Refills: 3 | Status: SHIPPED | OUTPATIENT
Start: 2022-04-20

## 2022-04-20 NOTE — TELEPHONE ENCOUNTER
Care Due:                  Date            Visit Type   Department     Provider  --------------------------------------------------------------------------------                                EP [facility] FAMILY                              PRIMARY      MED/ INTERNAL  Coleman Lui  Last Visit: 03-      CARE ([facility])   MED/ PEDS      Ehrensing  Next Visit: None Scheduled  None         None Found                                                            Last  Test          Frequency    Reason                     Performed    Due Date  --------------------------------------------------------------------------------    Lipid Panel.  12 months..  mirtazapine..............  Not Found    Overdue    Powered by [employer] by [employer]. Reference number: [id number].   4/20/2022 4:27:52 PM CDT

## 2022-06-06 NOTE — TELEPHONE ENCOUNTER
Refill Routing Note   Medication(s) are not appropriate for processing by [facility] for the following reason(s):      - Required laboratory values are outdated    [facility] action(s):  Defer          Medication reconciliation completed: No     Appointments  past 12m or future 3m with PCP    Date Provider   Last Visit   3/24/2022 Coleman Cortez MD   Next Visit   Visit date not found Coleman Cortez MD   ED visits in past 90 days: 0        Note composed:5:02 PM 06/06/2022

## 2022-06-06 NOTE — TELEPHONE ENCOUNTER
No new care gaps identified.  [facility] Embedded Care Gaps. Reference number: [id number]. 6/06/2022   4:43:52 PM CDT

## 2022-06-07 RX ORDER — MIRTAZAPINE 45 MG/1
TABLET, FILM COATED ORAL
Qty: 90 TABLET | Refills: 12 | Status: SHIPPED | OUTPATIENT
Start: 2022-06-07

## 2022-06-24 DIAGNOSIS — G89.29 OTHER CHRONIC PAIN: ICD-10-CM

## 2022-06-24 RX ORDER — HYDROCODONE BITARTRATE AND ACETAMINOPHEN 5; 325 MG/1; MG/1
1 TABLET ORAL EVERY 6 HOURS PRN
Qty: 60 TABLET | Refills: 0 | Status: SHIPPED | OUTPATIENT
Start: 2022-06-24

## 2022-06-24 RX ORDER — HYDROCODONE BITARTRATE AND ACETAMINOPHEN 5; 325 MG/1; MG/1
1 TABLET ORAL EVERY 6 HOURS PRN
Qty: 60 TABLET | Refills: 0 | Status: SHIPPED | OUTPATIENT
Start: 2022-06-24 | End: 2022-09-06 | Stop reason: SDUPTHER

## 2022-06-24 NOTE — TELEPHONE ENCOUNTER
Care Due:                  Date            Visit Type   Department     Provider  --------------------------------------------------------------------------------                                EP [facility] FAMILY                              PRIMARY      MED/ INTERNAL  Coleman Lui  Last Visit: 03-      CARE ([facility])   MED/ PEDS      Ehrensing  Next Visit: None Scheduled  None         None Found                                                            Last  Test          Frequency    Reason                     Performed    Due Date  --------------------------------------------------------------------------------    Lipid Panel.  12 months..  mirtazapine..............  Not Found    Overdue    [employer] Embedded Care Gaps. Reference number: [id number]. 6/24/2022   10:47:38 AM CDT

## 2022-06-24 NOTE — TELEPHONE ENCOUNTER
----- Message from Jimi Lopez sent at 6/24/2022 10:37 AM CDT -----  Regarding: refill and call back from nurse of Dr. Cortez  Type: RX Refill Request    Who Called: Autin     Refill or New Rx: refill     RX Name and Strength: HYDROcodone-acetaminophen (NORCO) 5-325 mg per tablet    Is this a 30 day or 90 day RX:90 day    Preferred Pharmacy with phone number:[facility]/PHARMACY #5310 [address]      Would the patient rather a call back or a response via [facility]? Call back     Best Call Back Number: [phone]    Additional Information: the patient is requesting a call back from a nurse to notify him when his medication is sent over. He also has a question to ask as well. Please return call at earliest convenience.

## 2022-07-18 ENCOUNTER — TELEPHONE (OUTPATIENT)
Dept: FAMILY MEDICINE | Facility: CLINIC | Age: 87
End: 2022-07-18
Payer: MEDICARE

## 2022-07-18 NOTE — TELEPHONE ENCOUNTER
----- Message from Niurka David sent at 7/18/2022  4:10 PM CDT -----  Type: Patient Call Back    Who called:Self    What is the request in detail: Pt is inquiring about taking a pill to help with his memory. Please would like to speak with a nurse before taking.    Can the clinic reply by [facility]? no    Would the patient rather a call back or a response via [facility]? Call back    Best call back number: [phone] (home)       Additional Information

## 2022-07-19 NOTE — TELEPHONE ENCOUNTER
Spoke with patient and notified him of that as per Dr. Cortez the medicine is expensive and has been proven not to work.

## 2022-07-22 ENCOUNTER — TELEPHONE (OUTPATIENT)
Dept: FAMILY MEDICINE | Facility: CLINIC | Age: 87
End: 2022-07-22
Payer: MEDICARE

## 2022-07-22 NOTE — TELEPHONE ENCOUNTER
Spoke to patient and he sts that he haven't been able to sleep properly. He is currently taking mirtazapine 45 mg. He would like to know if he can take two or can you up his dosage? Please advise!

## 2022-07-22 NOTE — TELEPHONE ENCOUNTER
----- Message from Mary Ramsey MA sent at 7/22/2022  3:44 PM CDT -----  Type: Patient Call Back    Who called: Self    What is the request in detail: pt. Would like to know if he can increase his medication because he's not sleeping at night ..     Can the clinic reply by [facility]?no    Would the patient rather a call back or a response via [facility]? yes    Best call back number: [phone]

## 2022-07-25 NOTE — TELEPHONE ENCOUNTER
Call pt AND family if possible    The 45 mg remeron is the FULL dose. What kind of sleep problem are you having?    prob best you set an appt to discuss the DETAILS of the sleep issue since sleep meds AND last appt was March and its good we check on you every 3-4 mo for the pain med refills      Set appt

## 2022-09-06 DIAGNOSIS — G89.29 OTHER CHRONIC PAIN: ICD-10-CM

## 2022-09-06 NOTE — TELEPHONE ENCOUNTER
----- Message from Paige Huntens sent at 9/6/2022 11:07 AM CDT -----  Regarding: Refill  Contact: MARK MCKEON JR. [[id number]]  Type:  RX Refill Request    Who Called: MARK MCKEON JR. [[id number]]  Refill or New Rx:Refill  RX Name and Strength:HYDROcodone-acetaminophen (NORCO) 5-325 mg per tablet  How is the patient currently taking it? (ex. 1XDay):ecery 6hrs as needed for pain   Is this a 30 day or 90 day RX:60 tablet  Preferred Pharmacy with phone number:[facility]/PHARMACY #[id number] [facility] [place] [place] [facility] [address] [facility]  Local or Mail Order:Local   Ordering Provider:Dr Dana INMAN  Would the patient rather a call back or a response via [facility]? Call back   Best Call Back Number:[phone]  Additional Information:

## 2022-09-06 NOTE — TELEPHONE ENCOUNTER
Care Due:                  Date            Visit Type   Department     Provider  --------------------------------------------------------------------------------                                EP [facility] FAMILY                              PRIMARY      MED/ INTERNAL  Coleman Lui  Last Visit: 03-      CARE ([facility])   MED/ PEDS      Ehrensing  Next Visit: None Scheduled  None         None Found                                                            Last  Test          Frequency    Reason                     Performed    Due Date  --------------------------------------------------------------------------------    Lipid Panel.  12 months..  mirtazapine..............  Not Found    Overdue    [employer] Embedded Care Gaps. Reference number: [id number]. 9/06/2022   1:10:58 PM CDT

## 2022-09-07 RX ORDER — HYDROCODONE BITARTRATE AND ACETAMINOPHEN 5; 325 MG/1; MG/1
1 TABLET ORAL EVERY 6 HOURS PRN
Qty: 60 TABLET | Refills: 0 | Status: SHIPPED | OUTPATIENT
Start: 2022-09-07 | End: 2022-10-10 | Stop reason: SDUPTHER

## 2022-09-07 NOTE — TELEPHONE ENCOUNTER
----- Message from Aristeo Tatum sent at 9/7/2022 11:44 AM CDT -----  Type: Patient Call Back    Who called:self    What is the request in detail:Pt says the doctor was supposed to refill his pain meds yesterday, but he still has not. Pt says he is out of meds. Please call.    Can the clinic reply by [facility]?no    Would the patient rather a call back or a response via [facility]? call    Best call back number:[phone] (home)

## 2022-10-07 DIAGNOSIS — G89.29 OTHER CHRONIC PAIN: ICD-10-CM

## 2022-10-07 RX ORDER — HYDROCODONE BITARTRATE AND ACETAMINOPHEN 5; 325 MG/1; MG/1
1 TABLET ORAL EVERY 6 HOURS PRN
Qty: 60 TABLET | Refills: 0 | Status: CANCELLED | OUTPATIENT
Start: 2022-10-07

## 2022-10-07 NOTE — TELEPHONE ENCOUNTER
----- Message from Mary Ramsey MA sent at 10/7/2022  2:30 PM CDT -----  Type: RX Refill Request    Who Called: Self    Have you contacted your pharmacy: no    Refill or New Rx:refill    RX Name and Strength:HYDROcodone-acetaminophen (NORCO) 5-325 mg per tablet 60 tablet     Preferred Pharmacy with phone number:[facility]/pharmacy #[id number] [address]   Phone:  [phone]  Fax:  [phone]          Local or Mail Order:local    Ordering Provider:Ehrensing    Would the patient rather a call back or a response via [facility]? Yes    Best Call Back Number:[phone] (home)

## 2022-10-07 NOTE — TELEPHONE ENCOUNTER
No new care gaps identified.  [facility] Embedded Care Gaps. Reference number: [id number]. 10/07/2022   2:43:50 PM CDT

## 2022-10-07 NOTE — TELEPHONE ENCOUNTER
----- Message from Danica Tran sent at 10/7/2022  9:27 AM CDT -----  Type: Patient Call Back    Who called: Anjel Clay/ Son     What is the request in detail: Anjel stated he want to have his dad medication list of all the medication he supposed to be taking. And to please give him a call back     Can the clinic reply by [facility]? No     Would the patient rather a call back or a response via [facility]? Call     Best call back number: [phone]

## 2022-10-07 NOTE — TELEPHONE ENCOUNTER
Called and scheduled appointment for medication refill 10/21 at 3:20 pm. Patient states he only have one pill left and would like to know can some get sent to his pharmacy until his appointment.   Please advise

## 2022-10-08 ENCOUNTER — NURSE TRIAGE (OUTPATIENT)
Dept: ADMINISTRATIVE | Facility: CLINIC | Age: 87
End: 2022-10-08
Payer: MEDICARE

## 2022-10-08 NOTE — TELEPHONE ENCOUNTER
Reason for Disposition   Caller requesting a CONTROLLED substance prescription refill (e.g., narcotics, ADHD medicines)    Additional Information   Negative: [1] Prescription refill request for ESSENTIAL medicine (i.e., likelihood of harm to patient if not taken) AND [2] triager unable to refill per department policy   Negative: [1] Prescription not at pharmacy AND [2] was prescribed by PCP recently  (Exception: triager has access to EMR and prescription is recorded there. Go to Home Care and confirm for pharmacy.)   Negative: [1] Pharmacy calling with prescription questions AND [2] triager unable to answer question   Negative: Prescription request for new medicine (not a refill)    Protocols used: Medication Refill and Renewal Call-A-    Pt called [facility] line to get refill for Norco 5-325 mg because he ran out of medication. Pt states medication was supposed to be refilled on Friday but prescription not written.      Pt was advised to call PCP's office when open on Monday per triage protocol.  Pt verbalized understanding.

## 2022-10-10 ENCOUNTER — TELEPHONE (OUTPATIENT)
Dept: FAMILY MEDICINE | Facility: CLINIC | Age: 87
End: 2022-10-10
Payer: MEDICARE

## 2022-10-10 DIAGNOSIS — G89.29 OTHER CHRONIC PAIN: ICD-10-CM

## 2022-10-10 RX ORDER — HYDROCODONE BITARTRATE AND ACETAMINOPHEN 5; 325 MG/1; MG/1
1 TABLET ORAL EVERY 6 HOURS PRN
Qty: 60 TABLET | Refills: 0 | Status: SHIPPED | OUTPATIENT
Start: 2022-10-10

## 2022-10-10 RX ORDER — HYDROCODONE BITARTRATE AND ACETAMINOPHEN 5; 325 MG/1; MG/1
1 TABLET ORAL EVERY 6 HOURS PRN
Qty: 60 TABLET | Refills: 0 | Status: CANCELLED | OUTPATIENT
Start: 2022-10-10

## 2022-10-10 NOTE — TELEPHONE ENCOUNTER
No new care gaps identified.  [facility] Embedded Care Gaps. Reference number: [id number]. 10/10/2022   1:34:23 PM CDT

## 2022-10-10 NOTE — TELEPHONE ENCOUNTER
Notified patient that the Rx refill has been forwarded to the physician for approval.

## 2022-10-10 NOTE — TELEPHONE ENCOUNTER
Call, let him know that prescription was sent to the pharmacy and that it looks like we received the initial refill request on Friday and that Dr. Cortez was not able to get to any refills over the weekend.    When getting close to the end of your supply in the future, call us to start the refill process even a week ahead of time and that will be helpful, also call earlier in the week

## 2022-10-10 NOTE — TELEPHONE ENCOUNTER
No new care gaps identified.  [facility] Embedded Care Gaps. Reference number: [id number]. 10/10/2022   1:25:42 PM CDT

## 2022-10-11 NOTE — TELEPHONE ENCOUNTER
----- Message from Coleman Cortez MD sent at 10/11/2022  6:54 AM CDT -----  Regarding: FW: Self/  [phone]  See other messages about this issue- done yesterday  ??  ----- Message -----  From: Esperanza Mcintyre  Sent: 10/10/2022   1:19 PM CDT  To: [facility] CAIN Staff  Subject: Self/  [phone]                              Type: RX Refill Request    Who Called:  Patient    Refill or New Rx: Refill    RX Name and Strength:   HYDROcodone-acetaminophen (NORCO) 5-325 mg per tablet    Preferred Pharmacy with phone number:  [facility]/PHARMACY #[id number] [place], [place]  [facility]    Local or Mail Order:  Local    Ordering Provider:  HENNY Cortez    Would the patient rather a call back or a response via [facility]?   Call back    Best Call Back Number:  [phone]

## 2022-11-16 ENCOUNTER — PES CALL (OUTPATIENT)
Dept: ADMINISTRATIVE | Facility: CLINIC | Age: 87
End: 2022-11-16
Payer: MEDICARE

## 2023-01-19 ENCOUNTER — PES CALL (OUTPATIENT)
Dept: ADMINISTRATIVE | Facility: CLINIC | Age: 88
End: 2023-01-19
Payer: MEDICARE

## 2024-01-29 NOTE — PATIENT INSTRUCTIONS
Recommend lotions: eucerin, eucerin for diabetics, aquaphor, A&D ointment, gold bond for diabetics, sween, [place]'s Bees all purpose baby ointment,  urea 40 with aloe (found on amazon.com)    Shoe recommendations: (try 6pm.com, zappos.com , nordstromrack.[employer], or shoes.[employer] for discounted prices) you can visit [employer] in [place]  or [employer] in the [facility] (there are also several shoe brand outlets in the [facility])    Asics (GT 2000 or gel foundations), new balance stability type shoes, saucony (stabil c3),  Light (GTS or Beast or transcend), vionic, propet (tennis shoe)    sofft brand, clarks, crocs, aerosoles, naturalizers, SAS, ecco, born, luana mathur, rockports (dress shoes)    Vionic, burkenstocks, fitflops, propet (sandals)  Nike comfort thong sandals, crocs, propet (house shoes)    Nail Home remedy:  Vicks Vapor rub to nails for easier managability    Occasional soaks for 15-20 mins in luke warm water with 1 cup of listerine and 1 cup of apple cider vinegar are ok You may add several drops of oil of oregano or tea tree oil as well      Wearing Proper Shoes                    You walk on your feet every day, forcing them to support the weight of your body. Repeated stress on your feet can cause damage over time. The right shoes can help protect your feet. The wrong shoes can cause more foot problems. Read the information below to help you find a shoe that fits your foot needs.      A good shoe fit will cover your foot outline. A shoe that doesnt cover the outline is a bad fit.   Whats your foot shape?  To get a good fit, you need to know the shape of your foot. Do this simple test: While standing, place your foot on a piece of paper and trace around it. Is your foot straight or curved? Do you have a foot problem, such as a bunion, that causes your foot outline to show a bulge on the side of your big toe?  Finding your fit  Bring your foot outline to the shoe store to help you find the right shoe. Place  a shoe you like on top of the outline to see if it matches the shape. The shoe should cover the outline. (If you have a bunion, the shoe may not cover the bulge on the outline. Look for soft leather shoes to stretch over the bunion.) Once youve found a pair of proper shoes, put them on. Walk around. Be sure the shoes dont rub or pinch. If the shoes feel good, youve found your fit!  The right shoe for you  A good shoe has features that provide comfort and support. It must also be the right size and shape for your feet. Look for a shoe made of breathable fabric and lining, such as leather or canvas. Be sure that shoes have enough tread to prevent slipping. Go to a good shoe store for help finding the right shoe.  Good shoe features  An ideal shoe has the following:  · Laces for support. If tying laces is a problem for you, try shoes with Velcro fasteners or blanca.  · A front of the shoe (toe box) with ½ inch space in front of your longest toes.  · An arch shape that supports your foot.  · No more than 1½ inches of heel.  · A stiff, snug back of the shoe to keep your foot from sliding around.  · A smooth lining with no rough seams.  Shoe shopping tips  Below are some dos and donts for when you go to the shoe store.  Do:  · Select the shoes that feel right. Wear them around the house. Then bring them to your foot healthcare provider to check for fit. If they dont fit well, return them.  · Shop late in the day, when your feet will be slightly bigger.  · Each time you buy shoes, have both your feet measured while you are standing. Foot size changes with time.  · Pick shoes to suit their purpose. High heels are OK for an occasional night on the town. But for everyday wear, choose a more sensible shoe.  · Try on shoes while wearing any inserts specially made for your feet (orthoses).  · Try on both the right and left shoes. If your feet are different sizes, pick a pair that fits the larger foot.  Dont:  · Dont buy  shoes based on shoe size alone. Always try on shoes, as sizes differ from brand to brand and within brands.  · Dont expect shoes to break in. If they dont fit at the store, dont buy them.  · Dont buy a shoe that doesnt match your foot shape.  What about socks?  Always wear socks with shoes. Socks help absorb sweat and reduce friction and blistering. When shopping for shoes, choose soft, padded socks with seams that dont irritate your feet.  If you have foot problems  Some foot problems cause deformities. This can make it hard to find a good fit. Look for shoes made of soft leather to stretch over the deformity. If you have bunions, buy shoes with a wider toe box. To fit hammertoes, look for shoes with a tall toe box. If you have arch problems, you may need inserts. In some cases, youll need to have custom footwear or orthoses made for your feet.  Suggested footwear  Ask your healthcare provider what kind of footwear you need. He or she may recommend a certain brand or shoe store.  Date Last Reviewed: 8/1/2016  © 1578-4772 [employer]. [address] [place] [address]. All rights reserved. This information is not intended as a substitute for professional medical care. Always follow your healthcare professional's instructions.        Step-by-Step:  Inspecting Your Feet   Date Last Reviewed: 10/1/2016  © 1204-0569 [employer]. [address] [place] [address]. All rights reserved. This information is not intended as a substitute for professional medical care. Always follow your healthcare professional's instructions.           16

## 2025-02-06 ENCOUNTER — PATIENT OUTREACH (OUTPATIENT)
Dept: ADMINISTRATIVE | Facility: HOSPITAL | Age: OVER 89
End: 2025-02-06
Payer: MEDICARE

## 2025-07-16 NOTE — TELEPHONE ENCOUNTER
Care Due:                  Date            Visit Type   Department     Provider  --------------------------------------------------------------------------------                                             [facility] FAMILY                                           MED/ INTERNAL  Coleman Lui  Last Visit: 07-      None         MED/ PEDS      Ehrensing  Next Visit: None Scheduled  None         None Found                                                            Last  Test          Frequency    Reason                     Performed    Due Date  --------------------------------------------------------------------------------    Lipid Panel.  12 months..  mirtazapine..............  Not Found    Overdue    Powered by [employer]. Reference number: [id number].   1/19/2022 10:54:41 AM CST   Detail Level: Zone